# Patient Record
Sex: MALE | Race: WHITE | Employment: OTHER | URBAN - METROPOLITAN AREA
[De-identification: names, ages, dates, MRNs, and addresses within clinical notes are randomized per-mention and may not be internally consistent; named-entity substitution may affect disease eponyms.]

---

## 2018-01-01 ENCOUNTER — APPOINTMENT (OUTPATIENT)
Dept: SPEECH THERAPY | Facility: CLINIC | Age: 83
End: 2018-01-01
Payer: COMMERCIAL

## 2018-01-01 ENCOUNTER — TELEPHONE (OUTPATIENT)
Dept: GERIATRICS | Facility: CLINIC | Age: 83
End: 2018-01-01

## 2018-01-01 ENCOUNTER — NURSING HOME VISIT (OUTPATIENT)
Dept: GERIATRICS | Facility: CLINIC | Age: 83
End: 2018-01-01
Payer: COMMERCIAL

## 2018-01-01 ENCOUNTER — NURSING HOME VISIT (OUTPATIENT)
Dept: GERIATRICS | Facility: CLINIC | Age: 83
End: 2018-01-01
Payer: MEDICARE

## 2018-01-01 ENCOUNTER — CARE COORDINATION (OUTPATIENT)
Dept: GERIATRIC MEDICINE | Facility: CLINIC | Age: 83
End: 2018-01-01

## 2018-01-01 ENCOUNTER — HOSPITAL ENCOUNTER (OUTPATIENT)
Facility: CLINIC | Age: 83
Setting detail: OBSERVATION
Discharge: NURSING, REHAB OR LONG-TERM CARE | End: 2018-05-11
Attending: FAMILY MEDICINE | Admitting: FAMILY MEDICINE
Payer: COMMERCIAL

## 2018-01-01 ENCOUNTER — MEDICAL CORRESPONDENCE (OUTPATIENT)
Dept: HEALTH INFORMATION MANAGEMENT | Facility: CLINIC | Age: 83
End: 2018-01-01

## 2018-01-01 ENCOUNTER — APPOINTMENT (OUTPATIENT)
Dept: GENERAL RADIOLOGY | Facility: CLINIC | Age: 83
End: 2018-01-01
Attending: FAMILY MEDICINE
Payer: COMMERCIAL

## 2018-01-01 ENCOUNTER — TRANSFERRED RECORDS (OUTPATIENT)
Dept: HEALTH INFORMATION MANAGEMENT | Facility: CLINIC | Age: 83
End: 2018-01-01

## 2018-01-01 ENCOUNTER — RECORDS - HEALTHEAST (OUTPATIENT)
Dept: LAB | Facility: CLINIC | Age: 83
End: 2018-01-01

## 2018-01-01 ENCOUNTER — PATIENT OUTREACH (OUTPATIENT)
Dept: GERIATRIC MEDICINE | Facility: CLINIC | Age: 83
End: 2018-01-01

## 2018-01-01 VITALS
OXYGEN SATURATION: 97 % | SYSTOLIC BLOOD PRESSURE: 148 MMHG | DIASTOLIC BLOOD PRESSURE: 73 MMHG | HEART RATE: 90 BPM | BODY MASS INDEX: 17.36 KG/M2 | TEMPERATURE: 96.9 F | WEIGHT: 128 LBS | RESPIRATION RATE: 16 BRPM

## 2018-01-01 VITALS
DIASTOLIC BLOOD PRESSURE: 68 MMHG | TEMPERATURE: 98.3 F | SYSTOLIC BLOOD PRESSURE: 119 MMHG | OXYGEN SATURATION: 99 % | HEART RATE: 64 BPM | RESPIRATION RATE: 15 BRPM | WEIGHT: 146.88 LBS | BODY MASS INDEX: 22.33 KG/M2

## 2018-01-01 VITALS
SYSTOLIC BLOOD PRESSURE: 143 MMHG | RESPIRATION RATE: 14 BRPM | HEART RATE: 63 BPM | HEIGHT: 72 IN | WEIGHT: 146.8 LBS | BODY MASS INDEX: 19.88 KG/M2 | TEMPERATURE: 95.5 F | OXYGEN SATURATION: 96 % | DIASTOLIC BLOOD PRESSURE: 64 MMHG

## 2018-01-01 VITALS
RESPIRATION RATE: 20 BRPM | OXYGEN SATURATION: 98 % | TEMPERATURE: 97.1 F | DIASTOLIC BLOOD PRESSURE: 85 MMHG | SYSTOLIC BLOOD PRESSURE: 163 MMHG | WEIGHT: 155 LBS | HEART RATE: 75 BPM

## 2018-01-01 VITALS
BODY MASS INDEX: 17.95 KG/M2 | WEIGHT: 132.5 LBS | HEART RATE: 66 BPM | TEMPERATURE: 98.1 F | HEIGHT: 72 IN | DIASTOLIC BLOOD PRESSURE: 76 MMHG | RESPIRATION RATE: 14 BRPM | SYSTOLIC BLOOD PRESSURE: 138 MMHG

## 2018-01-01 VITALS
OXYGEN SATURATION: 96 % | WEIGHT: 155.75 LBS | DIASTOLIC BLOOD PRESSURE: 77 MMHG | TEMPERATURE: 96.5 F | HEART RATE: 70 BPM | SYSTOLIC BLOOD PRESSURE: 149 MMHG | RESPIRATION RATE: 12 BRPM

## 2018-01-01 VITALS
WEIGHT: 143 LBS | OXYGEN SATURATION: 95 % | DIASTOLIC BLOOD PRESSURE: 80 MMHG | TEMPERATURE: 96.3 F | HEART RATE: 60 BPM | RESPIRATION RATE: 17 BRPM | SYSTOLIC BLOOD PRESSURE: 132 MMHG | BODY MASS INDEX: 21.74 KG/M2

## 2018-01-01 VITALS
BODY MASS INDEX: 21.22 KG/M2 | RESPIRATION RATE: 20 BRPM | TEMPERATURE: 98.5 F | DIASTOLIC BLOOD PRESSURE: 81 MMHG | HEIGHT: 68 IN | WEIGHT: 139.99 LBS | OXYGEN SATURATION: 97 % | HEART RATE: 70 BPM | SYSTOLIC BLOOD PRESSURE: 146 MMHG

## 2018-01-01 VITALS
WEIGHT: 155.25 LBS | SYSTOLIC BLOOD PRESSURE: 140 MMHG | DIASTOLIC BLOOD PRESSURE: 68 MMHG | HEART RATE: 56 BPM | TEMPERATURE: 98 F | OXYGEN SATURATION: 96 % | RESPIRATION RATE: 22 BRPM

## 2018-01-01 VITALS
WEIGHT: 150.1 LBS | TEMPERATURE: 96.8 F | RESPIRATION RATE: 18 BRPM | DIASTOLIC BLOOD PRESSURE: 75 MMHG | OXYGEN SATURATION: 95 % | HEART RATE: 107 BPM | BODY MASS INDEX: 22.82 KG/M2 | SYSTOLIC BLOOD PRESSURE: 110 MMHG

## 2018-01-01 VITALS
DIASTOLIC BLOOD PRESSURE: 63 MMHG | TEMPERATURE: 97.6 F | SYSTOLIC BLOOD PRESSURE: 111 MMHG | HEART RATE: 81 BPM | OXYGEN SATURATION: 98 % | WEIGHT: 143 LBS | BODY MASS INDEX: 21.74 KG/M2 | RESPIRATION RATE: 15 BRPM

## 2018-01-01 VITALS
BODY MASS INDEX: 19.76 KG/M2 | DIASTOLIC BLOOD PRESSURE: 64 MMHG | HEIGHT: 72 IN | SYSTOLIC BLOOD PRESSURE: 122 MMHG | HEART RATE: 60 BPM | OXYGEN SATURATION: 98 % | RESPIRATION RATE: 16 BRPM | WEIGHT: 145.9 LBS | TEMPERATURE: 97.3 F

## 2018-01-01 DIAGNOSIS — F31.70 BIPOLAR DISORDER IN PARTIAL REMISSION, MOST RECENT EPISODE UNSPECIFIED TYPE (H): Chronic | ICD-10-CM

## 2018-01-01 DIAGNOSIS — Z51.5 HOSPICE CARE PATIENT: ICD-10-CM

## 2018-01-01 DIAGNOSIS — I50.9 CHRONIC CONGESTIVE HEART FAILURE, UNSPECIFIED CONGESTIVE HEART FAILURE TYPE: ICD-10-CM

## 2018-01-01 DIAGNOSIS — G30.1 LATE ONSET ALZHEIMER'S DISEASE WITHOUT BEHAVIORAL DISTURBANCE (H): ICD-10-CM

## 2018-01-01 DIAGNOSIS — R13.19 OTHER DYSPHAGIA: ICD-10-CM

## 2018-01-01 DIAGNOSIS — R33.9 URINE RETENTION: ICD-10-CM

## 2018-01-01 DIAGNOSIS — G30.1 LATE ONSET ALZHEIMER'S DISEASE WITHOUT BEHAVIORAL DISTURBANCE (H): Primary | ICD-10-CM

## 2018-01-01 DIAGNOSIS — F02.80 LATE ONSET ALZHEIMER'S DISEASE WITHOUT BEHAVIORAL DISTURBANCE (H): ICD-10-CM

## 2018-01-01 DIAGNOSIS — E03.9 HYPOTHYROIDISM, UNSPECIFIED TYPE: ICD-10-CM

## 2018-01-01 DIAGNOSIS — F32.A DEPRESSIVE DISORDER: ICD-10-CM

## 2018-01-01 DIAGNOSIS — K21.9 GASTROESOPHAGEAL REFLUX DISEASE WITHOUT ESOPHAGITIS: ICD-10-CM

## 2018-01-01 DIAGNOSIS — F31.32 BIPOLAR AFFECTIVE DISORDER, CURRENTLY DEPRESSED, MODERATE (H): Primary | ICD-10-CM

## 2018-01-01 DIAGNOSIS — K59.09 OTHER CONSTIPATION: ICD-10-CM

## 2018-01-01 DIAGNOSIS — F02.80 LATE ONSET ALZHEIMER'S DISEASE WITHOUT BEHAVIORAL DISTURBANCE (H): Primary | ICD-10-CM

## 2018-01-01 DIAGNOSIS — F31.32 BIPOLAR AFFECTIVE DISORDER, CURRENTLY DEPRESSED, MODERATE (H): ICD-10-CM

## 2018-01-01 DIAGNOSIS — R41.82 ALTERED MENTAL STATUS, UNSPECIFIED ALTERED MENTAL STATUS TYPE: ICD-10-CM

## 2018-01-01 DIAGNOSIS — N18.9 CHRONIC KIDNEY DISEASE, UNSPECIFIED CKD STAGE: ICD-10-CM

## 2018-01-01 DIAGNOSIS — F31.70 BIPOLAR DISORDER IN PARTIAL REMISSION, MOST RECENT EPISODE UNSPECIFIED TYPE (H): ICD-10-CM

## 2018-01-01 DIAGNOSIS — Z91.81 PERSONAL HISTORY OF FALL: Primary | ICD-10-CM

## 2018-01-01 DIAGNOSIS — F31.70 BIPOLAR DISORDER IN PARTIAL REMISSION, MOST RECENT EPISODE UNSPECIFIED TYPE (H): Primary | ICD-10-CM

## 2018-01-01 DIAGNOSIS — J69.0 PNEUMONITIS DUE TO INHALATION OF FOOD OR VOMITUS (H): Primary | ICD-10-CM

## 2018-01-01 DIAGNOSIS — J69.0 PNEUMONITIS DUE TO INHALATION OF FOOD OR VOMITUS (H): ICD-10-CM

## 2018-01-01 DIAGNOSIS — F32.A DEPRESSIVE DISORDER: Chronic | ICD-10-CM

## 2018-01-01 DIAGNOSIS — J69.0 ASPIRATION PNEUMONITIS (H): Primary | ICD-10-CM

## 2018-01-01 DIAGNOSIS — R62.7 ADULT FAILURE TO THRIVE: ICD-10-CM

## 2018-01-01 DIAGNOSIS — I50.9 CHRONIC CONGESTIVE HEART FAILURE, UNSPECIFIED CONGESTIVE HEART FAILURE TYPE: Primary | ICD-10-CM

## 2018-01-01 DIAGNOSIS — E44.0 MODERATE PROTEIN-CALORIE MALNUTRITION (H): ICD-10-CM

## 2018-01-01 DIAGNOSIS — J69.0 ASPIRATION PNEUMONIA DUE TO INHALATION OF VOMITUS (H): ICD-10-CM

## 2018-01-01 LAB
ALBUMIN SERPL-MCNC: 3.8 G/DL (ref 3.4–5)
ALBUMIN UR-MCNC: NEGATIVE MG/DL
ALP SERPL-CCNC: 66 U/L (ref 40–150)
ALT SERPL W P-5'-P-CCNC: 34 U/L (ref 0–70)
ANION GAP SERPL CALCULATED.3IONS-SCNC: 11 MMOL/L (ref 5–18)
ANION GAP SERPL CALCULATED.3IONS-SCNC: 6 MMOL/L (ref 3–14)
ANION GAP SERPL CALCULATED.3IONS-SCNC: 6 MMOL/L (ref 3–14)
ANION GAP SERPL CALCULATED.3IONS-SCNC: 8 MMOL/L (ref 5–18)
ANION GAP SERPL CALCULATED.3IONS-SCNC: 8 MMOL/L (ref 5–18)
ANION GAP SERPL CALCULATED.3IONS-SCNC: 9 MMOL/L (ref 5–18)
APPEARANCE UR: CLEAR
AST SERPL W P-5'-P-CCNC: 22 U/L (ref 0–45)
BACTERIA SPEC CULT: NO GROWTH
BASOPHILS # BLD AUTO: 0 10E9/L (ref 0–0.2)
BASOPHILS # BLD AUTO: 0 10E9/L (ref 0–0.2)
BASOPHILS NFR BLD AUTO: 0.1 %
BASOPHILS NFR BLD AUTO: 0.2 %
BILIRUB DIRECT SERPL-MCNC: <0.1 MG/DL (ref 0–0.2)
BILIRUB SERPL-MCNC: 0.2 MG/DL (ref 0.2–1.3)
BILIRUB UR QL STRIP: NEGATIVE
BNP SERPL-MCNC: 248 PG/ML (ref 0–93)
BUN SERPL-MCNC: 29 MG/DL (ref 8–28)
BUN SERPL-MCNC: 29 MG/DL (ref 8–28)
BUN SERPL-MCNC: 30 MG/DL (ref 7–30)
BUN SERPL-MCNC: 30 MG/DL (ref 8–28)
BUN SERPL-MCNC: 32 MG/DL (ref 8–28)
BUN SERPL-MCNC: 37 MG/DL (ref 7–30)
CALCIUM SERPL-MCNC: 8.4 MG/DL (ref 8.5–10.1)
CALCIUM SERPL-MCNC: 8.9 MG/DL (ref 8.5–10.1)
CALCIUM SERPL-MCNC: 9.2 MG/DL (ref 8.5–10.5)
CALCIUM SERPL-MCNC: 9.2 MG/DL (ref 8.5–10.5)
CALCIUM SERPL-MCNC: 9.6 MG/DL (ref 8.5–10.5)
CALCIUM SERPL-MCNC: 9.7 MG/DL (ref 8.5–10.5)
CHLORIDE BLD-SCNC: 109 MMOL/L (ref 98–107)
CHLORIDE BLD-SCNC: 110 MMOL/L (ref 98–107)
CHLORIDE BLD-SCNC: 115 MMOL/L (ref 98–107)
CHLORIDE SERPL-SCNC: 114 MMOL/L (ref 94–109)
CHLORIDE SERPL-SCNC: 116 MMOL/L (ref 94–109)
CHLORIDE SERPLBLD-SCNC: 109 MMOL/L (ref 98–107)
CO2 SERPL-SCNC: 21 MMOL/L (ref 22–31)
CO2 SERPL-SCNC: 23 MMOL/L (ref 20–32)
CO2 SERPL-SCNC: 24 MMOL/L (ref 20–32)
CO2 SERPL-SCNC: 25 MMOL/L (ref 22–31)
COLOR UR AUTO: YELLOW
CREAT SERPL-MCNC: 0.99 MG/DL (ref 0.7–1.3)
CREAT SERPL-MCNC: 1.15 MG/DL (ref 0.7–1.3)
CREAT SERPL-MCNC: 1.15 MG/DL (ref 0.7–1.3)
CREAT SERPL-MCNC: 1.2 MG/DL (ref 0.66–1.25)
CREAT SERPL-MCNC: 1.29 MG/DL (ref 0.7–1.3)
CREAT SERPL-MCNC: 1.5 MG/DL (ref 0.66–1.25)
DIFFERENTIAL METHOD BLD: ABNORMAL
DIFFERENTIAL METHOD BLD: ABNORMAL
EOSINOPHIL # BLD AUTO: 0 10E9/L (ref 0–0.7)
EOSINOPHIL # BLD AUTO: 0.1 10E9/L (ref 0–0.7)
EOSINOPHIL NFR BLD AUTO: 0.1 %
EOSINOPHIL NFR BLD AUTO: 0.4 %
ERYTHROCYTE [DISTWIDTH] IN BLOOD BY AUTOMATED COUNT: 13.2 % (ref 10–15)
ERYTHROCYTE [DISTWIDTH] IN BLOOD BY AUTOMATED COUNT: 13.6 % (ref 10–15)
ERYTHROCYTE [DISTWIDTH] IN BLOOD BY AUTOMATED COUNT: 13.7 % (ref 11–14.5)
GFR SERPL CREATININE-BSD FRML MDRD: 44 ML/MIN/1.7M2
GFR SERPL CREATININE-BSD FRML MDRD: 53 ML/MIN/1.73M2
GFR SERPL CREATININE-BSD FRML MDRD: 57 ML/MIN/1.7M2
GFR SERPL CREATININE-BSD FRML MDRD: 60 ML/MIN/1.73M2
GFR SERPL CREATININE-BSD FRML MDRD: 60 ML/MIN/1.73M2
GFR SERPL CREATININE-BSD FRML MDRD: >60 ML/MIN/1.73M2
GLUCOSE BLD-MCNC: 63 MG/DL (ref 70–125)
GLUCOSE BLD-MCNC: 73 MG/DL (ref 70–125)
GLUCOSE BLD-MCNC: 86 MG/DL (ref 70–125)
GLUCOSE SERPL-MCNC: 114 MG/DL (ref 70–99)
GLUCOSE SERPL-MCNC: 126 MG/DL (ref 70–99)
GLUCOSE SERPL-MCNC: 63 MG/DL (ref 70–125)
GLUCOSE UR STRIP-MCNC: 50 MG/DL
HCT VFR BLD AUTO: 38.4 % (ref 40–53)
HCT VFR BLD AUTO: 43.7 % (ref 40–53)
HCT VFR BLD AUTO: 45.8 % (ref 40–54)
HGB BLD-MCNC: 12.7 G/DL (ref 13.3–17.7)
HGB BLD-MCNC: 13.1 G/DL (ref 14–18)
HGB BLD-MCNC: 14.6 G/DL (ref 13.3–17.7)
HGB BLD-MCNC: 14.9 G/DL (ref 14–18)
HGB UR QL STRIP: NEGATIVE
IMM GRANULOCYTES # BLD: 0 10E9/L (ref 0–0.4)
IMM GRANULOCYTES # BLD: 0 10E9/L (ref 0–0.4)
IMM GRANULOCYTES NFR BLD: 0.1 %
IMM GRANULOCYTES NFR BLD: 0.1 %
KETONES UR STRIP-MCNC: NEGATIVE MG/DL
LACTATE BLD-SCNC: 1.4 MMOL/L (ref 0.7–2)
LEUKOCYTE ESTERASE UR QL STRIP: NEGATIVE
LYMPHOCYTES # BLD AUTO: 0.5 10E9/L (ref 0.8–5.3)
LYMPHOCYTES # BLD AUTO: 0.8 10E9/L (ref 0.8–5.3)
LYMPHOCYTES NFR BLD AUTO: 3.8 %
LYMPHOCYTES NFR BLD AUTO: 5.8 %
Lab: NORMAL
MCH RBC QN AUTO: 29.3 PG (ref 26.5–33)
MCH RBC QN AUTO: 29.4 PG (ref 26.5–33)
MCH RBC QN AUTO: 29.4 PG (ref 27–34)
MCHC RBC AUTO-ENTMCNC: 32.5 G/DL (ref 32–36)
MCHC RBC AUTO-ENTMCNC: 33.1 G/DL (ref 31.5–36.5)
MCHC RBC AUTO-ENTMCNC: 33.4 G/DL (ref 31.5–36.5)
MCV RBC AUTO: 88 FL (ref 78–100)
MCV RBC AUTO: 89 FL (ref 78–100)
MCV RBC AUTO: 91 FL (ref 80–100)
MONOCYTES # BLD AUTO: 0.8 10E9/L (ref 0–1.3)
MONOCYTES # BLD AUTO: 0.9 10E9/L (ref 0–1.3)
MONOCYTES NFR BLD AUTO: 6.2 %
MONOCYTES NFR BLD AUTO: 6.5 %
NEUTROPHILS # BLD AUTO: 11.8 10E9/L (ref 1.6–8.3)
NEUTROPHILS # BLD AUTO: 12.3 10E9/L (ref 1.6–8.3)
NEUTROPHILS NFR BLD AUTO: 87.3 %
NEUTROPHILS NFR BLD AUTO: 89.4 %
NITRATE UR QL: NEGATIVE
PH UR STRIP: 6 PH (ref 5–7)
PLATELET # BLD AUTO: 173 10E9/L (ref 150–450)
PLATELET # BLD AUTO: 204 10E9/L (ref 150–450)
PLATELET # BLD AUTO: 210 THOU/UL (ref 140–440)
PMV BLD AUTO: 11.3 FL (ref 8.5–12.5)
POTASSIUM BLD-SCNC: 3.7 MMOL/L (ref 3.5–5)
POTASSIUM BLD-SCNC: 4.1 MMOL/L (ref 3.5–5)
POTASSIUM BLD-SCNC: 4.9 MMOL/L (ref 3.5–5)
POTASSIUM SERPL-SCNC: 4.1 MMOL/L (ref 3.4–5.3)
POTASSIUM SERPL-SCNC: 4.4 MMOL/L (ref 3.4–5.3)
POTASSIUM SERPL-SCNC: 4.9 MMOL/L (ref 3.5–5)
PROT SERPL-MCNC: 7.5 G/DL (ref 6.8–8.8)
RBC # BLD AUTO: 4.33 10E12/L (ref 4.4–5.9)
RBC # BLD AUTO: 4.97 10E12/L (ref 4.4–5.9)
RBC # BLD AUTO: 5.06 MILL/UL (ref 4.4–6.2)
RBC #/AREA URNS AUTO: 1 /HPF (ref 0–2)
SODIUM SERPL-SCNC: 142 MMOL/L (ref 136–145)
SODIUM SERPL-SCNC: 142 MMOL/L (ref 136–145)
SODIUM SERPL-SCNC: 144 MMOL/L (ref 133–144)
SODIUM SERPL-SCNC: 144 MMOL/L (ref 136–145)
SODIUM SERPL-SCNC: 145 MMOL/L (ref 133–144)
SODIUM SERPL-SCNC: 147 MMOL/L (ref 136–145)
SOURCE: ABNORMAL
SP GR UR STRIP: 1.02 (ref 1–1.03)
SPECIMEN SOURCE: NORMAL
TROPONIN I SERPL-MCNC: <0.015 UG/L (ref 0–0.04)
TSH SERPL DL<=0.005 MIU/L-ACNC: 0.78 UIU/ML (ref 0.3–5)
TSH SERPL-ACNC: 0.78 UIU/ML (ref 0.3–5)
UROBILINOGEN UR STRIP-MCNC: 0 MG/DL (ref 0–2)
VALPROATE SERPL-MCNC: 12.5 UG/ML (ref 50–150)
WBC # BLD AUTO: 13.6 10E9/L (ref 4–11)
WBC # BLD AUTO: 13.8 10E9/L (ref 4–11)
WBC #/AREA URNS AUTO: <1 /HPF (ref 0–5)
WBC: 9.5 THOU/UL (ref 4–11)

## 2018-01-01 PROCEDURE — 25000132 ZZH RX MED GY IP 250 OP 250 PS 637: Performed by: PHYSICIAN ASSISTANT

## 2018-01-01 PROCEDURE — 71046 X-RAY EXAM CHEST 2 VIEWS: CPT

## 2018-01-01 PROCEDURE — 99309 SBSQ NF CARE MODERATE MDM 30: CPT | Performed by: FAMILY MEDICINE

## 2018-01-01 PROCEDURE — 99223 1ST HOSP IP/OBS HIGH 75: CPT | Mod: AI | Performed by: PHYSICIAN ASSISTANT

## 2018-01-01 PROCEDURE — 99309 SBSQ NF CARE MODERATE MDM 30: CPT | Performed by: NURSE PRACTITIONER

## 2018-01-01 PROCEDURE — 36415 COLL VENOUS BLD VENIPUNCTURE: CPT | Performed by: PHYSICIAN ASSISTANT

## 2018-01-01 PROCEDURE — 99308 SBSQ NF CARE LOW MDM 20: CPT | Mod: GW | Performed by: NURSE PRACTITIONER

## 2018-01-01 PROCEDURE — 25000128 H RX IP 250 OP 636: Performed by: PHYSICIAN ASSISTANT

## 2018-01-01 PROCEDURE — 40000225 ZZH STATISTIC SLP WARD VISIT: Performed by: SPEECH-LANGUAGE PATHOLOGIST

## 2018-01-01 PROCEDURE — 84484 ASSAY OF TROPONIN QUANT: CPT | Performed by: FAMILY MEDICINE

## 2018-01-01 PROCEDURE — G8996 SWALLOW CURRENT STATUS: HCPCS | Mod: GN,CK | Performed by: SPEECH-LANGUAGE PATHOLOGIST

## 2018-01-01 PROCEDURE — 80048 BASIC METABOLIC PNL TOTAL CA: CPT | Performed by: PHYSICIAN ASSISTANT

## 2018-01-01 PROCEDURE — 99305 1ST NF CARE MODERATE MDM 35: CPT | Performed by: FAMILY MEDICINE

## 2018-01-01 PROCEDURE — 92610 EVALUATE SWALLOWING FUNCTION: CPT | Mod: GN | Performed by: SPEECH-LANGUAGE PATHOLOGIST

## 2018-01-01 PROCEDURE — 99318 ZZC ANNUAL NURSING FAC ASSESSMNT, STABLE: CPT | Mod: GW | Performed by: NURSE PRACTITIONER

## 2018-01-01 PROCEDURE — 83605 ASSAY OF LACTIC ACID: CPT | Performed by: FAMILY MEDICINE

## 2018-01-01 PROCEDURE — 85025 COMPLETE CBC W/AUTO DIFF WBC: CPT | Performed by: PHYSICIAN ASSISTANT

## 2018-01-01 PROCEDURE — 82248 BILIRUBIN DIRECT: CPT | Performed by: FAMILY MEDICINE

## 2018-01-01 PROCEDURE — G0378 HOSPITAL OBSERVATION PER HR: HCPCS

## 2018-01-01 PROCEDURE — 99217 ZZC OBSERVATION CARE DISCHARGE: CPT | Performed by: FAMILY MEDICINE

## 2018-01-01 PROCEDURE — 85025 COMPLETE CBC W/AUTO DIFF WBC: CPT | Performed by: FAMILY MEDICINE

## 2018-01-01 PROCEDURE — 25000132 ZZH RX MED GY IP 250 OP 250 PS 637: Performed by: FAMILY MEDICINE

## 2018-01-01 PROCEDURE — G8998 SWALLOW D/C STATUS: HCPCS | Mod: GN,CK | Performed by: SPEECH-LANGUAGE PATHOLOGIST

## 2018-01-01 PROCEDURE — 12000000 ZZH R&B MED SURG/OB

## 2018-01-01 PROCEDURE — 99309 SBSQ NF CARE MODERATE MDM 30: CPT | Mod: GW | Performed by: FAMILY MEDICINE

## 2018-01-01 PROCEDURE — 96376 TX/PRO/DX INJ SAME DRUG ADON: CPT

## 2018-01-01 PROCEDURE — 87086 URINE CULTURE/COLONY COUNT: CPT | Performed by: PHYSICIAN ASSISTANT

## 2018-01-01 PROCEDURE — G8997 SWALLOW GOAL STATUS: HCPCS | Mod: GN,CK | Performed by: SPEECH-LANGUAGE PATHOLOGIST

## 2018-01-01 PROCEDURE — 99285 EMERGENCY DEPT VISIT HI MDM: CPT | Mod: 25 | Performed by: FAMILY MEDICINE

## 2018-01-01 PROCEDURE — 81001 URINALYSIS AUTO W/SCOPE: CPT | Performed by: PHYSICIAN ASSISTANT

## 2018-01-01 PROCEDURE — 25000128 H RX IP 250 OP 636: Performed by: FAMILY MEDICINE

## 2018-01-01 PROCEDURE — 80053 COMPREHEN METABOLIC PANEL: CPT | Performed by: FAMILY MEDICINE

## 2018-01-01 PROCEDURE — 96365 THER/PROPH/DIAG IV INF INIT: CPT | Performed by: FAMILY MEDICINE

## 2018-01-01 PROCEDURE — 27210995 ZZH RX 272: Performed by: FAMILY MEDICINE

## 2018-01-01 RX ORDER — VENLAFAXINE HYDROCHLORIDE 75 MG/1
75 CAPSULE, EXTENDED RELEASE ORAL DAILY
Status: DISCONTINUED | OUTPATIENT
Start: 2018-01-01 | End: 2018-01-01 | Stop reason: DRUGHIGH

## 2018-01-01 RX ORDER — AMPICILLIN AND SULBACTAM 2; 1 G/1; G/1
3 INJECTION, POWDER, FOR SOLUTION INTRAMUSCULAR; INTRAVENOUS EVERY 6 HOURS
Status: DISCONTINUED | OUTPATIENT
Start: 2018-01-01 | End: 2018-01-01 | Stop reason: HOSPADM

## 2018-01-01 RX ORDER — VENLAFAXINE HYDROCHLORIDE 75 MG/1
75 TABLET, EXTENDED RELEASE ORAL
Status: DISCONTINUED | OUTPATIENT
Start: 2018-01-01 | End: 2018-01-01 | Stop reason: HOSPADM

## 2018-01-01 RX ORDER — ACETAMINOPHEN 650 MG/1
650 SUPPOSITORY RECTAL EVERY 4 HOURS PRN
COMMUNITY

## 2018-01-01 RX ORDER — NALOXONE HYDROCHLORIDE 0.4 MG/ML
.1-.4 INJECTION, SOLUTION INTRAMUSCULAR; INTRAVENOUS; SUBCUTANEOUS
Status: DISCONTINUED | OUTPATIENT
Start: 2018-01-01 | End: 2018-01-01 | Stop reason: HOSPADM

## 2018-01-01 RX ORDER — BISACODYL 10 MG
10 SUPPOSITORY, RECTAL RECTAL DAILY PRN
COMMUNITY
End: 2018-01-01

## 2018-01-01 RX ORDER — SODIUM CHLORIDE 9 MG/ML
INJECTION, SOLUTION INTRAVENOUS CONTINUOUS
Status: DISCONTINUED | OUTPATIENT
Start: 2018-01-01 | End: 2018-01-01

## 2018-01-01 RX ORDER — ACETAMINOPHEN 325 MG/1
650 TABLET ORAL EVERY 4 HOURS PRN
Status: DISCONTINUED | OUTPATIENT
Start: 2018-01-01 | End: 2018-01-01 | Stop reason: HOSPADM

## 2018-01-01 RX ORDER — PROCHLORPERAZINE MALEATE 5 MG
5 TABLET ORAL EVERY 6 HOURS PRN
Status: DISCONTINUED | OUTPATIENT
Start: 2018-01-01 | End: 2018-01-01 | Stop reason: HOSPADM

## 2018-01-01 RX ORDER — ONDANSETRON 4 MG/1
4 TABLET, ORALLY DISINTEGRATING ORAL EVERY 6 HOURS PRN
Status: DISCONTINUED | OUTPATIENT
Start: 2018-01-01 | End: 2018-01-01 | Stop reason: HOSPADM

## 2018-01-01 RX ORDER — BISACODYL 10 MG
10 SUPPOSITORY, RECTAL RECTAL DAILY PRN
COMMUNITY

## 2018-01-01 RX ORDER — AMOXICILLIN AND CLAVULANATE POTASSIUM 500; 125 MG/1; MG/1
1 TABLET, FILM COATED ORAL 2 TIMES DAILY
Qty: 20 TABLET | Refills: 0 | DISCHARGE
Start: 2018-01-01 | End: 2018-01-01

## 2018-01-01 RX ORDER — ASPIRIN 81 MG/1
81 TABLET, CHEWABLE ORAL AT BEDTIME
Status: DISCONTINUED | OUTPATIENT
Start: 2018-01-01 | End: 2018-01-01 | Stop reason: HOSPADM

## 2018-01-01 RX ORDER — POLYETHYLENE GLYCOL 3350 17 G/17G
17 POWDER, FOR SOLUTION ORAL DAILY
COMMUNITY
End: 2018-01-01

## 2018-01-01 RX ORDER — MORPHINE SULFATE 20 MG/ML
SOLUTION ORAL
COMMUNITY

## 2018-01-01 RX ORDER — ONDANSETRON 2 MG/ML
4 INJECTION INTRAMUSCULAR; INTRAVENOUS EVERY 6 HOURS PRN
Status: DISCONTINUED | OUTPATIENT
Start: 2018-01-01 | End: 2018-01-01 | Stop reason: HOSPADM

## 2018-01-01 RX ORDER — PROCHLORPERAZINE 25 MG
12.5 SUPPOSITORY, RECTAL RECTAL EVERY 12 HOURS PRN
Status: DISCONTINUED | OUTPATIENT
Start: 2018-01-01 | End: 2018-01-01 | Stop reason: HOSPADM

## 2018-01-01 RX ORDER — AMPICILLIN AND SULBACTAM 2; 1 G/1; G/1
3 INJECTION, POWDER, FOR SOLUTION INTRAMUSCULAR; INTRAVENOUS ONCE
Status: COMPLETED | OUTPATIENT
Start: 2018-01-01 | End: 2018-01-01

## 2018-01-01 RX ORDER — SENNOSIDES 8.6 MG
1 TABLET ORAL 2 TIMES DAILY
COMMUNITY

## 2018-01-01 RX ORDER — HYOSCYAMINE SULFATE 0.125 MG
0.12 TABLET ORAL EVERY 4 HOURS PRN
COMMUNITY

## 2018-01-01 RX ORDER — VENLAFAXINE HYDROCHLORIDE 75 MG/1
75 CAPSULE, EXTENDED RELEASE ORAL DAILY
COMMUNITY

## 2018-01-01 RX ORDER — LEVOTHYROXINE SODIUM 100 UG/1
88 TABLET ORAL DAILY
COMMUNITY
End: 2018-01-01

## 2018-01-01 RX ORDER — FLUTICASONE PROPIONATE 50 MCG
1 SPRAY, SUSPENSION (ML) NASAL DAILY
COMMUNITY

## 2018-01-01 RX ORDER — SODIUM CHLORIDE 450 MG/100ML
INJECTION, SOLUTION INTRAVENOUS CONTINUOUS
Status: DISCONTINUED | OUTPATIENT
Start: 2018-01-01 | End: 2018-01-01 | Stop reason: HOSPADM

## 2018-01-01 RX ORDER — SODIUM CHLORIDE 9 MG/ML
1000 INJECTION, SOLUTION INTRAVENOUS CONTINUOUS
Status: DISCONTINUED | OUTPATIENT
Start: 2018-01-01 | End: 2018-01-01

## 2018-01-01 RX ORDER — LEVOTHYROXINE SODIUM 88 UG/1
88 TABLET ORAL
Status: DISCONTINUED | OUTPATIENT
Start: 2018-01-01 | End: 2018-01-01 | Stop reason: HOSPADM

## 2018-01-01 RX ADMIN — ACETAMINOPHEN 650 MG: 325 TABLET, FILM COATED ORAL at 23:41

## 2018-01-01 RX ADMIN — SODIUM CHLORIDE: 9 INJECTION, SOLUTION INTRAVENOUS at 04:55

## 2018-01-01 RX ADMIN — SODIUM CHLORIDE: 4.5 INJECTION, SOLUTION INTRAVENOUS at 08:37

## 2018-01-01 RX ADMIN — AMPICILLIN SODIUM AND SULBACTAM SODIUM 3 G: 2; 1 INJECTION, POWDER, FOR SOLUTION INTRAMUSCULAR; INTRAVENOUS at 11:27

## 2018-01-01 RX ADMIN — SODIUM CHLORIDE 500 ML: 9 INJECTION, SOLUTION INTRAVENOUS at 21:42

## 2018-01-01 RX ADMIN — VENLAFAXINE HYDROCHLORIDE 75 MG: 75 TABLET, EXTENDED RELEASE ORAL at 08:30

## 2018-01-01 RX ADMIN — ASPIRIN 81 MG 81 MG: 81 TABLET ORAL at 23:41

## 2018-01-01 RX ADMIN — AMPICILLIN SODIUM AND SULBACTAM SODIUM 3 G: 2; 1 INJECTION, POWDER, FOR SOLUTION INTRAMUSCULAR; INTRAVENOUS at 04:53

## 2018-01-01 RX ADMIN — LEVOTHYROXINE SODIUM 88 MCG: 88 TABLET ORAL at 08:30

## 2018-01-01 RX ADMIN — AMPICILLIN SODIUM AND SULBACTAM SODIUM 3 G: 2; 1 INJECTION, POWDER, FOR SOLUTION INTRAMUSCULAR; INTRAVENOUS at 22:12

## 2018-02-10 ENCOUNTER — RECORDS - HEALTHEAST (OUTPATIENT)
Dept: LAB | Facility: CLINIC | Age: 83
End: 2018-02-10

## 2018-02-10 LAB
ANION GAP SERPL CALCULATED.3IONS-SCNC: 8 MMOL/L (ref 5–18)
BUN SERPL-MCNC: 25 MG/DL (ref 8–28)
CALCIUM SERPL-MCNC: 8.5 MG/DL (ref 8.5–10.5)
CHLORIDE BLD-SCNC: 109 MMOL/L (ref 98–107)
CO2 SERPL-SCNC: 22 MMOL/L (ref 22–31)
CREAT SERPL-MCNC: 1.09 MG/DL (ref 0.7–1.3)
GFR SERPL CREATININE-BSD FRML MDRD: >60 ML/MIN/1.73M2
GLUCOSE BLD-MCNC: 88 MG/DL (ref 70–125)
POTASSIUM BLD-SCNC: 4.5 MMOL/L (ref 3.5–5)
SODIUM SERPL-SCNC: 139 MMOL/L (ref 136–145)

## 2018-02-22 ENCOUNTER — RECORDS - HEALTHEAST (OUTPATIENT)
Dept: LAB | Facility: CLINIC | Age: 83
End: 2018-02-22

## 2018-02-23 LAB — 25(OH)D3 SERPL-MCNC: 31.5 NG/ML (ref 30–80)

## 2018-03-12 PROBLEM — E03.9 HYPOTHYROIDISM, UNSPECIFIED TYPE: Status: ACTIVE | Noted: 2018-01-01

## 2018-03-12 PROBLEM — N18.9 CHRONIC KIDNEY DISEASE, UNSPECIFIED CKD STAGE: Status: ACTIVE | Noted: 2018-01-01

## 2018-03-12 PROBLEM — R13.19 OTHER DYSPHAGIA: Status: ACTIVE | Noted: 2018-01-01

## 2018-03-12 PROBLEM — K21.9 GASTROESOPHAGEAL REFLUX DISEASE WITHOUT ESOPHAGITIS: Status: ACTIVE | Noted: 2018-01-01

## 2018-03-12 PROBLEM — K59.09 OTHER CONSTIPATION: Status: ACTIVE | Noted: 2018-01-01

## 2018-03-12 NOTE — PROGRESS NOTES
[facility]  PRIMARY CARE PROVIDER AND CLINIC:  Martín Chiu [address] [place] [address] / [facility] [address]    HPI:    Tian Espitia is a 86 year old ([birth year]) seen today at [facility].   Was previously an [facility] patient and recently opted to establish care with [facility].      HPI information obtained from: facility chart records, facility staff, patient report and [facility] chart review.    Current issues are:       Bipolar affective disorder, currently depressed, moderate (H)  Late onset Alzheimer's disease without behavioral disturbance  Altered mental status, unspecified altered mental status type  Depressive disorder  Other dysphagia  Hypothyroidism, unspecified type  Gastroesophageal reflux disease without esophagitis  Other constipation  Chronic kidney disease, unspecified CKD stage     Patient with above Hx/Dx, previously followed by Dr. Diallo at [facility], now transferred to  team, has resided at [facility] for approximately 3 years, status is stable, no acute concerns, increased incontinence, moderately confused with intermittent frustrated episodes due to inability to accomplish ADL's independently, redirectable, requires direction, mild UE tremor in L hand/arm, overall status is stable with age-related cognitive and physical decline.       CODE STATUS/ADVANCE DIRECTIVES DISCUSSION:   DNR DNI/comfort cares    Patient's living condition: lives in a nursing home    ALLERGIES:Review of patient's allergies indicates no known allergies.  PAST MEDICAL HISTORY:  has a past medical history of Alzheimer disease; Bipolar disorder (H); CKD (chronic kidney disease); Depressive disorder; Hyperlipemia; Mental disorder; and Paralysis agitans (H).  PAST SURGICAL HISTORY:  has a past surgical history that includes IR Gastro Jejunostomy Tube Placement (01/04/2008) and hernia repair.  FAMILY HISTORY: family history includes CEREBROVASCULAR DISEASE in his  mother.  SOCIAL HISTORY:  reports that he has never smoked. He has never used smokeless tobacco. He reports that he does not drink alcohol or use illicit drugs.    Post Discharge Medication Reconciliation Status: discharge medications reconciled, continue medications without change.  Current Outpatient Prescriptions   Medication Sig Dispense Refill     ASPIRIN PO Take 81 mg by mouth At Bedtime       venlafaxine (EFFEXOR-XR) 75 MG 24 hr capsule Take 75 mg by mouth daily       fluticasone (FLONASE) 50 MCG/ACT spray Spray 1 spray into both nostrils daily       levothyroxine (SYNTHROID/LEVOTHROID) 100 MCG tablet Take 100 mcg by mouth daily       polyethylene glycol (MIRALAX/GLYCOLAX) powder Take 17 g by mouth daily       ranitidine (ZANTAC) 150 MG tablet Take 150 mg by mouth At Bedtime       ACETAMINOPHEN PO Take 1,000 mg by mouth 2 times daily       Divalproex Sodium (DEPAKOTE PO) Take 125 mg by mouth 2 times daily       hypromellose (ARTIFICIAL TEARS) 0.4 % SOLN ophthalmic solution Place 1 drop into both eyes 4 times daily as needed for dry eyes       bisacodyl (DULCOLAX) 10 MG Suppository Place 10 mg rectally daily as needed for constipation         ROS:  Unobtainable secondary to cognitive impairment.     Exam:  /77  Pulse 70  Temp 96.5  F (35.8  C)  Resp 12  Wt 155 lb 12 oz (70.6 kg)  SpO2 96%  GENERAL APPEARANCE:  Alert, in no distress, thin  ENT:  Mouth and posterior oropharynx normal, moist mucous membranes, normal hearing acuity  RESP:  lungs clear to auscultation , no respiratory distress  CV:  no edema, irregular rhythm rate  ABDOMEN:  no guarding or rebound, bowel sounds normal  M/S:   Gait and station abnormal weak, gait disturbance, ambulates  SKIN:  Inspection of skin and subcutaneous tissue baseline  NEURO:   Examination of sensation by touch normal  PSYCH:  oriented to self, memory impaired     Lab/Diagnostic data:  None on file.  None in Care Everywhere.      ASSESSMENT/PLAN:  Bipolar  affective disorder, currently depressed, moderate (H)  Late onset Alzheimer's disease without behavioral disturbance  Altered mental status, unspecified altered mental status type  Depressive disorder  -patient today stable, pleasantly confused, no acute concerns  -continue venlafaxine 75mg Qd  -continue divalproex 125mg BID  - acid on 9/8/17 was 11.8 (range )  -will check  acid level on Mon 3/26/18  -consider wean of depakote as indicated, due for GDR      Other dysphagia  -patient having some difficulty with swallowing  -ST ordered on 3/6/18 to evaluate swallow issues and diet    Hypothyroidism, unspecified type  -TSH on 9/8/17 was 0.79  -continue levothyroxine 100mcg Qd  -will recheck TSH on 3/26/18     Gastroesophageal reflux disease without esophagitis  -patient has no s/s gut disturbances, stable  -continue ranitidine 150mg Qhs    Other constipation  -in past few months patient has had complaint of constipation  -started on miralax Qd, also has dulcolax rectal  -DC dulcolax rectal  -start senokot 8.6/50 tabs BID PRN for constipation     Chronic kidney disease, unspecified CKD stage  -Creat on 2/10/18 was 1.09  -dose medications renally as possible  -recheck BMP on 3/26/18 (along with Hgb)    Electronically signed by:  CELSA Crowell CNP

## 2018-04-02 NOTE — PROGRESS NOTES
[facility]  PRIMARY CARE PROVIDER AND CLINIC:  Martín Cihu [address] [place] [address] / [facility] [address]  Chief Complaint   Patient presents with     Clinic Care Coordination - Initial       HPI:    Tian Espitia is a 86 year old ([birth year]) seen today at [facility].   Was previously an [facility] patient and recently opted to establish care with [facility].   HPI information obtained from: facility staff, patient report and [facility] chart review.      Current issues are:      - Resident with PMH significant for AD, BPD, CKD, used to see Dr. Diallo at [facility], now transferred care to [facility] team at the facility.   -  Resident seen and examined. Reports sleep, appetite and BM are fine.   - GNP has no concern today.   - RN reports progressing dementia, requires help with assistance with all ADLs, new with new BI, on pureed diet thin liquid.       CODE STATUS/ADVANCE DIRECTIVES DISCUSSION:   DNR/DNI/comfort cares    Patient's living condition: lives in a nursing home    ALLERGIES:Review of patient's allergies indicates no known allergies.  PAST MEDICAL HISTORY:  has a past medical history of Alzheimer disease; Bipolar disorder (H); CKD (chronic kidney disease); Depressive disorder; Hyperlipemia; Mental disorder; and Paralysis agitans (H).  PAST SURGICAL HISTORY:  has a past surgical history that includes IR Gastro Jejunostomy Tube Placement (01/04/2008) and hernia repair.  FAMILY HISTORY: family history includes CEREBROVASCULAR DISEASE in his mother.  SOCIAL HISTORY:  reports that he has never smoked. He has never used smokeless tobacco. He reports that he does not drink alcohol or use illicit drugs.    Post Discharge Medication Reconciliation Status: discharge medications reconciled and changed, per note/orders (see AVS).  Current Outpatient Prescriptions   Medication Sig Dispense Refill     LEVOTHYROXINE SODIUM PO Take 88 mcg by mouth daily       ASPIRIN PO  Take 81 mg by mouth At Bedtime       venlafaxine (EFFEXOR-XR) 75 MG 24 hr capsule Take 75 mg by mouth daily       fluticasone (FLONASE) 50 MCG/ACT spray Spray 1 spray into both nostrils daily       polyethylene glycol (MIRALAX/GLYCOLAX) powder Take 17 g by mouth daily       ranitidine (ZANTAC) 150 MG tablet Take 150 mg by mouth At Bedtime       ACETAMINOPHEN PO Take 1,000 mg by mouth 2 times daily       Divalproex Sodium (DEPAKOTE PO) Take 125 mg by mouth 2 times daily       hypromellose (ARTIFICIAL TEARS) 0.4 % SOLN ophthalmic solution Place 1 drop into both eyes 4 times daily as needed for dry eyes         ROS:  Unobtainable secondary to cognitive impairment.     Exam:  /68  Pulse 56  Temp 98  F (36.7  C)  Resp 22  Wt 155 lb 4 oz (70.4 kg)  SpO2 96%  GENERAL APPEARANCE:  in no distress, cooperative  ENT:  Mouth and posterior oropharynx normal, moist mucous membranes  EYES:  EOM, conjunctivae, lids, pupils and irises normal  RESP:  respiratory effort and palpation of chest normal, lungs clear to auscultation , no respiratory distress  CV:  Palpation and auscultation of heart done , regular rate and rhythm, no murmur, rub, or gallop  ABDOMEN:  normal bowel sounds, soft, nontender, no hepatosplenomegaly or other masses  M/S:   Gait and station abnormal uses wC  SKIN:  Inspection of skin and subcutaneous tissue baseline, Palpation of skin and subcutaneous tissue baseline  NEURO:   no NFd appreciated on observation  PSYCH:  oriented to self, otherwise pleasantly confused.     Lab/Diagnostic data:   Last Basic Metabolic Panel:  Recent Labs   Lab Test 03/26/18   NA  142   POTASSIUM  4.9   CHLORIDE  109*   TONYA  9.2   CO2  25   BUN  29*   CR  1.15   GLC  63*       TSH   Date Value Ref Range Status   03/26/2018 0.78 0.30 - 5.00 uIU/mL Final     ASSESSMENT/PLAN:  Bipolar affective disorder, currently depressed, moderate (H)  Late onset Alzheimer's disease without behavioral disturbance  - stable on meds,  continue  - Continue to anticipate needs. Chronic condition, ongoing decline expected.   -  Continue to provide redirection and reassurance as needed. Maintain safe living situation with goals focused on comfort.      Other dysphagia  - SLP on the case,     Hypothyroidism, unspecified type  TSH   Date Value Ref Range Status   03/26/2018 0.78 0.30 - 5.00 uIU/mL Final    - LT4 reduced from 100 mcg to 88 mcg  - follow on TSH result, keep level around 5 in this frail elderly    Gastroesophageal reflux disease without esophagitis  -stable, continue Ranitidine 150mg Qhs     Other constipation  -will adjust meds for optimal result     Chronic kidney disease, stage 2  - - Avoid nephrotoxic drugs  - Renal dose the medications.     Orders:  1.  Senna 8.6 mg tab.  Take 2 tabs BID PRN for constipation, hold for watery stools or diarrhea.  2. Consider stopping ASA, likely has been given for primary preventive reason.         Electronically signed by:  Derick Long MD

## 2018-04-04 NOTE — LETTER
4/4/2018        RE: Tian Espitia  [address] [address]        [facility]  PRIMARY CARE PROVIDER AND CLINIC:  Apple Martín Felix [address] [place] [address] / [facility] [address]  Chief Complaint   Patient presents with     Clinic Care Coordination - Initial       HPI:    Tian Espitia is a 86 year old ([birth year]) seen today at [facility].   Was previously an [facility] patient and recently opted to establish care with [facility].   HPI information obtained from: facility staff, patient report and [facility] chart review.      Current issues are:      - Resident with PMH significant for AD, BPD, CKD, used to see Dr. Diallo at [facility], now transferred care to [facility] team at the facility.   -  Resident seen and examined. Reports sleep, appetite and BM are fine.   - GNP has no concern today.   - RN reports progressing dementia, requires help with assistance with all ADLs, new with new BI, on pureed diet thin liquid.       CODE STATUS/ADVANCE DIRECTIVES DISCUSSION:   DNR/DNI/comfort cares    Patient's living condition: lives in a nursing home    ALLERGIES:Review of patient's allergies indicates no known allergies.  PAST MEDICAL HISTORY:  has a past medical history of Alzheimer disease; Bipolar disorder (H); CKD (chronic kidney disease); Depressive disorder; Hyperlipemia; Mental disorder; and Paralysis agitans (H).  PAST SURGICAL HISTORY:  has a past surgical history that includes IR Gastro Jejunostomy Tube Placement (01/04/2008) and hernia repair.  FAMILY HISTORY: family history includes CEREBROVASCULAR DISEASE in his mother.  SOCIAL HISTORY:  reports that he has never smoked. He has never used smokeless tobacco. He reports that he does not drink alcohol or use illicit drugs.    Post Discharge Medication Reconciliation Status: discharge medications reconciled and changed, per note/orders (see AVS).  Current Outpatient Prescriptions   Medication Sig Dispense  Refill     LEVOTHYROXINE SODIUM PO Take 88 mcg by mouth daily       ASPIRIN PO Take 81 mg by mouth At Bedtime       venlafaxine (EFFEXOR-XR) 75 MG 24 hr capsule Take 75 mg by mouth daily       fluticasone (FLONASE) 50 MCG/ACT spray Spray 1 spray into both nostrils daily       polyethylene glycol (MIRALAX/GLYCOLAX) powder Take 17 g by mouth daily       ranitidine (ZANTAC) 150 MG tablet Take 150 mg by mouth At Bedtime       ACETAMINOPHEN PO Take 1,000 mg by mouth 2 times daily       Divalproex Sodium (DEPAKOTE PO) Take 125 mg by mouth 2 times daily       hypromellose (ARTIFICIAL TEARS) 0.4 % SOLN ophthalmic solution Place 1 drop into both eyes 4 times daily as needed for dry eyes         ROS:  Unobtainable secondary to cognitive impairment.     Exam:  /68  Pulse 56  Temp 98  F (36.7  C)  Resp 22  Wt 155 lb 4 oz (70.4 kg)  SpO2 96%  GENERAL APPEARANCE:  in no distress, cooperative  ENT:  Mouth and posterior oropharynx normal, moist mucous membranes  EYES:  EOM, conjunctivae, lids, pupils and irises normal  RESP:  respiratory effort and palpation of chest normal, lungs clear to auscultation , no respiratory distress  CV:  Palpation and auscultation of heart done , regular rate and rhythm, no murmur, rub, or gallop  ABDOMEN:  normal bowel sounds, soft, nontender, no hepatosplenomegaly or other masses  M/S:   Gait and station abnormal uses wC  SKIN:  Inspection of skin and subcutaneous tissue baseline, Palpation of skin and subcutaneous tissue baseline  NEURO:   no NFd appreciated on observation  PSYCH:  oriented to self, otherwise pleasantly confused.     Lab/Diagnostic data:   Last Basic Metabolic Panel:  Recent Labs   Lab Test 03/26/18   NA  142   POTASSIUM  4.9   CHLORIDE  109*   TONYA  9.2   CO2  25   BUN  29*   CR  1.15   GLC  63*       TSH   Date Value Ref Range Status   03/26/2018 0.78 0.30 - 5.00 uIU/mL Final     ASSESSMENT/PLAN:  Bipolar affective disorder, currently depressed, moderate (H)  Late onset  Alzheimer's disease without behavioral disturbance  - stable on meds, continue  - Continue to anticipate needs. Chronic condition, ongoing decline expected.   -  Continue to provide redirection and reassurance as needed. Maintain safe living situation with goals focused on comfort.      Other dysphagia  - SLP on the case,     Hypothyroidism, unspecified type  TSH   Date Value Ref Range Status   03/26/2018 0.78 0.30 - 5.00 uIU/mL Final    - LT4 reduced from 100 mcg to 88 mcg  - follow on TSH result, keep level around 5 in this frail elderly    Gastroesophageal reflux disease without esophagitis  -stable, continue Ranitidine 150mg Qhs     Other constipation  -will adjust meds for optimal result     Chronic kidney disease, stage 2  - - Avoid nephrotoxic drugs  - Renal dose the medications.     Orders:  1.  Senna 8.6 mg tab.  Take 2 tabs BID PRN for constipation, hold for watery stools or diarrhea.  2. Consider stopping ASA, likely has been given for primary preventive reason.         Electronically signed by:  Derick Long MD                    Sincerely,        Derick Long MD

## 2018-05-04 NOTE — PROGRESS NOTES
Supplemental benefit information shared with member.    [facility]   [facility]  [phone]

## 2018-05-07 NOTE — PROGRESS NOTES
[facility]    Chief Complaint   Patient presents with     group home Regulatory       [facility] Medical Record Number:  [id number]    HPI:    Tian Espitia is a 86 year old  (1/20/1932), who is being seen today for a federally mandated E/M visit at [facility].  HPI information obtained from: facility chart records, facility staff, patient report and [facility] chart review. Today's concerns are:     Bipolar disorder in partial remission, most recent episode unspecified type (H)  Late onset Alzheimer's disease without behavioral disturbance  Hypothyroidism, unspecified type  Gastroesophageal reflux disease without esophagitis     Patient has been stable, per staff no changes in status and/or behavior, recent labs as below, nutrition intake appropriate with no gut disturbances, does wander at times, makes comments regarding staff sometimes racial, shakes fist at certain staff, resistive to feeding and cares, declines participation in activities, no overt concerns.     ALLERGIES: Review of patient's allergies indicates no known allergies.  PAST MEDICAL HISTORY:  has a past medical history of Alzheimer disease; Bipolar disorder (H); CKD (chronic kidney disease); Depressive disorder; Hyperlipemia; Mental disorder; and Paralysis agitans (H).  PAST SURGICAL HISTORY:  has a past surgical history that includes IR Gastro Jejunostomy Tube Placement (01/04/2008) and hernia repair.  FAMILY HISTORY: family history includes CEREBROVASCULAR DISEASE in his mother.  SOCIAL HISTORY:  reports that he has never smoked. He has never used smokeless tobacco. He reports that he does not drink alcohol or use illicit drugs.    MEDICATIONS:  Current Outpatient Prescriptions   Medication Sig Dispense Refill     ACETAMINOPHEN PO Take 1,000 mg by mouth 2 times daily       ASPIRIN PO Take 81 mg by mouth At Bedtime       fluticasone (FLONASE) 50 MCG/ACT spray Spray 1 spray into both nostrils daily       hypromellose  (ARTIFICIAL TEARS) 0.4 % SOLN ophthalmic solution Place 1 drop into both eyes 4 times daily as needed for dry eyes       LEVOTHYROXINE SODIUM PO Take 88 mcg by mouth daily       polyethylene glycol (MIRALAX/GLYCOLAX) powder Take 17 g by mouth daily       sennosides (SENOKOT) 8.6 MG tablet Take 2 tablets by mouth 2 times daily as needed for constipation       venlafaxine (EFFEXOR-XR) 75 MG 24 hr capsule Take 75 mg by mouth daily       Medications reviewed:  Medications reconciled to facility chart and changes were made to reflect current medications as identified as above med list. Below are the changes that were made:   Medications stopped since last EPIC medication reconciliation:   There are no discontinued medications.    Medications started since last [facility] medication reconciliation:  No orders of the defined types were placed in this encounter.      Case Management:  I have reviewed the care plan and MDS and do agree with the plan. Patient's desire to return to the community is present, but is not able due to care needs .  Information reviewed:  Medications, vital signs, orders, and nursing notes    The health plan new enrollment has happened. I have reviewed the  MDS, the preventative needs,  and facility care plan. The level of care is appropriate. I have reviewed the code status/advanced directives.     Care Conference  The most recent care conference was done on approximately 4/16/18 and I have reviewed the notes.     Member/Responsible Party Interview:  Person answering below questions: patient/resident  1. What are the most important things to you? bowling  2. What activities do you enjoy doing? bowling  3. Do you like where you live?(if no, explain what you would change) Yes   4. Would you like to live elsewhere? No    I communicated with patients contact Katey, on the phone. I discussed the plan of care and gave my direct cell # call back if any further questions.    ROS:  Limited secondary to cognitive  impairment but today pt reports enjoys being here.    Exam:  Vitals: /85  Pulse 75  Temp 97.1  F (36.2  C)  Resp 20  Wt 155 lb (70.3 kg)  SpO2 98%  BMI= There is no height or weight on file to calculate BMI.  GENERAL APPEARANCE:  Alert, in no distress, appears healthy  ENT:  Mouth and posterior oropharynx normal, moist mucous membranes  RESP:  lungs clear to auscultation , no respiratory distress  CV:  Palpation and auscultation of heart done , no edema, irregular rhythm rate  ABDOMEN:  bowel sounds normal  M/S:   Gait and station abnormal requires assist, ADL support  SKIN:  Inspection of skin and subcutaneous tissue baseline  NEURO:   Examination of sensation by touch normal  PSYCH:  oriented to self?    Lab/Diagnostic data:     CBC RESULTS: No results for input(s): WBC, RBC, HGB, HCT, MCV, MCH, MCHC, RDW, PLT in the last 45228 hours.    Last Basic Metabolic Panel:  Recent Labs   Lab Test 03/26/18   NA  142   POTASSIUM  4.9   CHLORIDE  109*   TONYA  9.2   CO2  25   BUN  29*   CR  1.15   GLC  63*       Liver Function Studies - No results for input(s): PROTTOTAL, ALBUMIN, BILITOTAL, ALKPHOS, AST, ALT, BILIDIRECT in the last 68094 hours.    TSH   Date Value Ref Range Status   03/26/2018 0.78 0.30 - 5.00 uIU/mL Final   ]    No results found for: A1C    ASSESSMENT/PLAN  Bipolar disorder in partial remission, most recent episode unspecified type (H)  Late onset Alzheimer's disease without behavioral disturbance  -patient has no overt s/s depression  -behaviors mild/moderate with intermittent verbal comments  -staff able to assist/redirect   -depakote DC'd on 4/23/18, was on 125mg BID, of note VPA on 3/26 was 12.5 (range )  -continue venlafaxine ER 75mg Qd  -if having increase in cognitive changes/declien would consider increase in venlafaxine before initiation of psych medication    Hypothyroidism, unspecified type  -TSH on 3/26/18 was 0.78  -decreased levothyroxine from 100 to 88mcg  -plan to recheck TSH  in 6-12 months    Gastroesophageal reflux disease without esophagitis  -patient having no s/s gut disturbances, intake appropriate  -DC ranitidine today  -if having gut issues consider addition of CaCarb PRN    Family communication: spoke with spouse Katey today on phone, reviewed status, meds, and plan above, Katey concurs.      Electronically signed by:  CELSA Crowell CNP

## 2018-05-10 PROBLEM — E03.9 HYPOTHYROIDISM, UNSPECIFIED TYPE: Chronic | Status: ACTIVE | Noted: 2018-01-01

## 2018-05-10 PROBLEM — N17.9 AKI (ACUTE KIDNEY INJURY) (H): Status: ACTIVE | Noted: 2018-01-01

## 2018-05-10 PROBLEM — K21.9 GASTROESOPHAGEAL REFLUX DISEASE WITHOUT ESOPHAGITIS: Chronic | Status: ACTIVE | Noted: 2018-01-01

## 2018-05-10 PROBLEM — N18.9 CHRONIC KIDNEY DISEASE, UNSPECIFIED CKD STAGE: Chronic | Status: ACTIVE | Noted: 2018-01-01

## 2018-05-10 PROBLEM — J69.0 ASPIRATION PNEUMONIA (H): Status: ACTIVE | Noted: 2018-01-01

## 2018-05-10 NOTE — IP AVS SNAPSHOT
` ` Patient Information     Patient Name Sex     Tian Espitia ([id number]) Male 1932       Room Bed    2401 2401-      Patient Demographics     Address Phone    [address] [place] [id number] [phone] (Home)      Patient Ethnicity & Race     Ethnic Group Patient Race    American White      Emergency Contact(s)     Name Relation Home Work Mobile    DAVE ESPITIA Spouse [phone]      Documents on File        Status Date Received Description       Documents for the Patient    Consent for EHR Access Received 05/10/18     Insurance Card Received 05/10/18     External Medication Information Consent       Patient ID Scan Refused 05/10/18     [facility] Specified Other       Consent for Services/Privacy Notice - Hospital/Clinic       Privacy Notice - [place] Received 05/10/18     Care Everywhere Prospective Auth       Advance Directives and Living Will Received 18 POLST 2018    Consent for Services - Geriatrics Received 18     Consent for Services - Hospital and Clinic Received 05/10/18     HIE Auth Received 05/10/18        Documents for the Encounter    CMS IM for Patient Signature Received 05/10/18     Observation Notice Received 18     ECG   ECG Report      Admission Information     Attending Provider Admitting Provider Admission Type Admission Date/Time    Alexandro Bakns MD Kampfe, Kevin L, MD Emergency 05/10/18  190    Discharge Date Hospital Service Auth/Cert Status Service Area     [facility]    Unit Room/Bed Admission Status       [facility]240 Admission (Confirmed)       Admission     Complaint    Aspiration pneumonia (H), Aspiration pneumonia (H)      Hospital Account     Name Acct ID Class Status Primary Coverage    Tian Espitia [id number] Observation Open BLUE PLUS - BLUE PLUS SECURE BLUE            Guarantor Account (for Hospital Account #[id number])     Name Relation to Pt Service Area  Active? Acct Type    Tian Espitia Self FCS Yes Personal/Family    Address Phone          [address] [place] [address] [phone](H)              Coverage Information (for Hospital Account #[id number])     F/O Payor/Plan Precert #    BLUE PLUS/BLUE PLUS SECURE BLUE     Subscriber Subscriber #    Tian Espitia [id number]    Address Phone    [address] [address]  [place] [address] [phone]

## 2018-05-10 NOTE — TELEPHONE ENCOUNTER
"Called by nursing staff at [facility] about patient who is coughing up lots of clear phlegm, \"doesn't look right, or like his usual self\"     HR irreg (new) varying 30-140s, currently 111  /90  O2 sat 86-97% on room air  RR 16-24     PLAN: okay to send to ER for tachycardia, new irregular HR, cough and hypoxia.     Candace Pelaez MD   "

## 2018-05-10 NOTE — IP AVS SNAPSHOT
"    [facility] SURGICAL: [phone]                                              INTERAGENCY TRANSFER FORM - PHYSICIAN ORDERS   5/10/2018                    Hospital Admission Date: 5/10/2018  MARIO LUONG   : 1932  Sex: Male        Attending Provider: Alexandro Banks MD     Allergies:  No Known Allergies    Infection:  None   Service:  GENERAL MEDI    Ht:  1.727 m (5' 8\")   Wt:  63.5 kg (139 lb 15.9 oz)   Admission Wt:  63.5 kg (139 lb 15.9 oz)    BMI:  21.29 kg/m 2   BSA:  1.75 m 2            Patient PCP Information     Provider PCP Type    CELSA Crowell [facility] General      ED Clinical Impression     Diagnosis Description Comment Added By Time Added    Aspiration pneumonia due to inhalation of vomitus (H) [J69.0] Aspiration pneumonia due to inhalation of vomitus (H) [J69.0]  Osbaldo Clancy MD 5/10/2018 10:36 PM      Hospital Problems as of 2018              Priority Class Noted POA    Alzheimer's disease Medium  3/21/2007 Yes    Bipolar disorder (H) Medium  3/21/2007 Yes    Depressive disorder Medium  3/21/2007 Yes    Transient cerebral ischemia Medium  3/21/2007 Yes    * (Principal)Pneumonitis due to inhalation of food or vomitus (H) Medium  2007 Yes    Hypothyroidism, unspecified type Medium  3/12/2018 Yes    Gastroesophageal reflux disease without esophagitis Medium  3/12/2018 Yes    Chronic kidney disease, unspecified CKD stage Medium  3/12/2018 Yes    KARLIE (acute kidney injury) (H) Medium  5/10/2018 Yes    Aspiration pneumonia (H) Medium  5/10/2018 Yes      Non-Hospital Problems as of 2018              Priority Class Noted    Rash and other nonspecific skin eruption Medium  2012    Other dysphagia Medium  3/12/2018    Other constipation Medium  3/12/2018      Code Status History     This patient does not have a recorded code status. Please follow your organizational policy for patients in this situation.         Medication Review      START taking        " Dose / Directions Comments    amoxicillin-clavulanate 500-125 MG per tablet   Commonly known as:  AUGMENTIN   Used for:  Aspiration pneumonitis (H)        Dose:  1 tablet   Take 1 tablet by mouth 2 times daily for 5 days   Quantity:  20 tablet   Refills:  0          CONTINUE these medications which have NOT CHANGED        Dose / Directions Comments    ACETAMINOPHEN PO        Dose:  1000 mg   Take 1,000 mg by mouth 2 times daily   Refills:  0        ARTIFICIAL TEARS 0.4 % Soln ophthalmic solution   Generic drug:  hypromellose        Dose:  1 drop   Place 1 drop into both eyes 4 times daily as needed for dry eyes   Refills:  0        ASPIRIN PO        Dose:  81 mg   Take 81 mg by mouth At Bedtime   Refills:  0        fluticasone 50 MCG/ACT spray   Commonly known as:  FLONASE        Dose:  1 spray   Spray 1 spray into both nostrils daily   Refills:  0        LEVOTHYROXINE SODIUM PO        Dose:  88 mcg   Take 88 mcg by mouth daily   Refills:  0        polyethylene glycol powder   Commonly known as:  MIRALAX/GLYCOLAX        Dose:  17 g   Take 17 g by mouth daily   Refills:  0        sennosides 8.6 MG tablet   Commonly known as:  SENOKOT        Dose:  2 tablet   Take 2 tablets by mouth 2 times daily as needed for constipation   Refills:  0        venlafaxine 75 MG 24 hr capsule   Commonly known as:  EFFEXOR-XR        Dose:  75 mg   Take 75 mg by mouth daily   Refills:  0                After Care     Activity - Up with nursing assistance           Advance Diet as Tolerated       Follow this diet upon discharge: Regular - pureed solids and nectar consistency liquids.   (Dysphagia diet level 1/nectar)       General info for SNF       Length of Stay Estimate: Long Term Care  Condition at Discharge: Improving  Level of care:skilled   Rehabilitation Potential: Poor  Admission H&P remains valid and up-to-date: Yes  Recent Chemotherapy: N/A  Use Nursing Home Standing Orders: Yes             Statement of Approval     Ordered           05/11/18 1119  I have reviewed and agree with all the recommendations and orders detailed in this document.  EFFECTIVE NOW     Approved and electronically signed by:  Alexandro Banks MD

## 2018-05-10 NOTE — IP AVS SNAPSHOT
"` `           [facility] SURGICAL: [phone]                                              INTERAGENCY TRANSFER FORM - NURSING   5/10/2018                    Hospital Admission Date: 5/10/2018  MARIO LUONG   : 1932  Sex: Male        Attending Provider: Alexandro Banks MD     Allergies:  No Known Allergies    Infection:  None   Service:  GENERAL MEDI    Ht:  1.727 m (5' 8\")   Wt:  63.5 kg (139 lb 15.9 oz)   Admission Wt:  63.5 kg (139 lb 15.9 oz)    BMI:  21.29 kg/m 2   BSA:  1.75 m 2            Patient PCP Information     Provider PCP Type    Martín hCiu, APRN CNP General      Current Code Status     This patient does not have a recorded code status. Please follow your organizational policy for patients in this situation.      Code Status History     This patient does not have a recorded code status. Please follow your organizational policy for patients in this situation.      Advance Directives        Scanned docmt in ACP Activity?           Yes, scanned ACP docmt        Hospital Problems as of 2018              Priority Class Noted POA    Alzheimer's disease Medium  3/21/2007 Yes    Bipolar disorder (H) Medium  3/21/2007 Yes    Depressive disorder Medium  3/21/2007 Yes    Transient cerebral ischemia Medium  3/21/2007 Yes    * (Principal)Pneumonitis due to inhalation of food or vomitus (H) Medium  2007 Yes    Hypothyroidism, unspecified type Medium  3/12/2018 Yes    Gastroesophageal reflux disease without esophagitis Medium  3/12/2018 Yes    Chronic kidney disease, unspecified CKD stage Medium  3/12/2018 Yes    KARLIE (acute kidney injury) (H) Medium  5/10/2018 Yes    Aspiration pneumonia (H) Medium  5/10/2018 Yes      Non-Hospital Problems as of 2018              Priority Class Noted    Rash and other nonspecific skin eruption Medium  2012    Other dysphagia Medium  3/12/2018    Other constipation Medium  3/12/2018      Immunizations     None         END    " "  ASSESSMENT     Discharge Profile Flowsheet     EXPECTED DISCHARGE     Transportation Agency  [employer] 05/11/18 1113    Expected Discharge Date  05/12/18 05/11/18 1036   Skilled Nursing Facility  [facility] [phone], Fax: [phone] 05/11/18 1036    DISCHARGE NEEDS ASSESSMENT     SKIN      Primary Care Clinic Name  [facility] 05/11/18 1036   Inspection of bony prominences  Full 05/10/18 2336    Primary Care MD Name  Martín Chiu 05/11/18 1036   Inspection under devices  Focused Inspection (identify device(s) inspected) 05/10/18 2336    GASTROINTESTINAL (ADULT,PEDIATRIC,OB)     Focused inspection under devices  Glasses 05/10/18 2336    GI WDL  WDL 05/10/18 2336   Skin WDL  WDL;characteristics 05/10/18 2336    Passing flatus  yes 05/10/18 2336   Skin Temperature  warm 05/10/18 2336    COMMUNICATION ASSESSMENT     Skin Moisture  dry 05/10/18 2336    Patient's communication style  spoken language (English or Bilingual) 05/11/18 0111   Skin Integrity  -- (pink birthmark to back of neck) 05/10/18 2336    FINAL RESOURCES     SAFETY      Resources List  Skilled Nursing Facility 05/11/18 1036   Safety WDL  WDL 05/10/18 2336    Other Resources  Transportation Services 05/11/18 1113   All Alarms  alarm(s) activated and audible 05/11/18 0851                 Assessment WDL (Within Defined Limits) Definitions           Safety WDL     Effective: 09/28/15    Row Information: <b>WDL Definition:</b> Bed in low position, wheels locked; call light in reach; upper side rails up x 2; ID band on<br> <font color=\"gray\"><i>Item=AS safety wdl>>List=AS safety wdl>>Version=F14</i></font>      Skin WDL     Effective: 09/28/15    Row Information: <b>WDL Definition:</b> Warm; dry; intact; elastic; without discoloration; pressure points without redness<br> <font color=\"gray\"><i>Item=AS skin wdl>>List=AS skin wdl>>Version=F14</i></font>      Vitals     Vital Signs Flowsheet     VITAL SIGNS     BSA " "(Calculated - sq m)  1.75 05/10/18 2329    Temp  98.5  F (36.9  C) 05/11/18 1107   BMI (Calculated)  21.33 05/10/18 2329    Temp src  Oral 05/11/18 1107   EKG MONITORING      Resp  20 05/11/18 1107   Cardiac Regularity  Irregular (irregularly irregular) 05/10/18 1915    Pulse  70 05/11/18 1107   Cardiac Rhythm  Atrial fibrillation (some sinus beats; frequent ectopy, some a flutter appearing; remains dynamic. ) 05/10/18 1915    Heart Rate  71 05/11/18 0348   SHANE COMA SCALE      Pulse/Heart Rate Source  Monitor 05/11/18 1107   Best Eye Response  4-->(E4) spontaneous 05/10/18 2253    BP  146/81 05/11/18 1107   Best Motor Response  6-->(M6) obeys commands 05/10/18 2253    BP Location  Right arm 05/11/18 1107   Best Verbal Response  5-->(V5) oriented 05/10/18 2253    OXYGEN THERAPY     Flatgap Coma Scale Score  15 05/10/18 2253    SpO2  97 % 05/11/18 1107   POSITIONING      O2 Device  None (Room air) 05/11/18 1107   Body Position  independently positioning 05/11/18 0348    PAIN/COMFORT     Head of Bed (HOB)  HOB at 20-30 degrees 05/10/18 2336    Preferred Pain Scale  CAPA (Clinically Aligned Pain Assessment) ([facility] Adults Only) 05/10/18 2342   Positioning/Transfer Devices  pillows;in use 05/10/18 2336    CLINICALLY ALIGNED PAIN ASSESSMENT (CAPA) ([facility] ADULTS ONLY)     Chair  Recline and up in chair 05/11/18 1122    Comfort  comfortably manageable (appears comfortable) 05/10/18 2342   DAILY CARE      HEIGHT AND WEIGHT     Activity Management  ambulated to bathroom;up in chair 05/11/18 0903    Height  1.727 m (5' 8\") (per RN) 05/10/18 2329   Activity Assistance Provided  assistance, 1 person 05/11/18 0903    Weight  63.5 kg (139 lb 15.9 oz) 05/10/18 2329   Assistive Device Utilized  walker 05/10/18 2336            Patient Lines/Drains/Airways Status    Active LINES/DRAINS/AIRWAYS     Name: Placement date: Placement time: Site: Days: Last dressing change:    Peripheral IV " 05/10/18 Left Lower forearm 05/10/18   2200   Lower forearm   less than 1             Patient Lines/Drains/Airways Status    Active PICC/CVC     None            Intake/Output Detail Report     Date Intake     Output Net    Shift P.O. I.V. IV Piggyback Total Urine Total       Noc 05/09/18 2300 - 05/10/18 0659 -- -- -- -- -- -- 0    Day 05/10/18 0700 - 05/10/18 1459 -- -- -- -- -- -- 0    Yakelin 05/10/18 1500 - 05/10/18 2259 -- -- -- -- -- -- 0    Noc 05/10/18 2300 - 05/11/18 0659 30 600 -- 630 200 200 430    Day 05/11/18 0700 - 05/11/18 1459 30 243 -- 273 -- -- 273      Last Void/BM       Most Recent Value    Urine Occurrence 1 at 05/11/2018 0845    Stool Occurrence       Case Management/Discharge Planning     Case Management/Discharge Planning Flowsheet     REFERRAL INFORMATION     Support Assessment  Adequate family and caregiver support 05/11/18 1036    Did the Initial Social Work Assessment result in a Social Work Case?  No 05/11/18 1036   COPING/STRESS      Reason For Consult  other (see comments) (Nursing Home LTC Resident) 05/11/18 1036   Major Change/Loss/Stressor  none 05/11/18 0111    Primary Care Clinic Name  [facility] 05/11/18 1036   EXPECTED DISCHARGE      Primary Care MD Name  Martín Chiu 05/11/18 1036   Expected Discharge Date  05/12/18 05/11/18 1036    LIVING ENVIRONMENT     FINAL RESOURCES      Lives With  facility resident 05/11/18 1036   Resources List  Skilled Nursing Facility 05/11/18 1036    Living Arrangements  extended care facility 05/11/18 1036   Other Resources  Transportation Services 05/11/18 1113    Able to Return to Prior Living Arrangements  yes 05/11/18 1036   Transportation Agency  [employer] 05/11/18 1113    ASSESSMENT OF FAMILY/SOCIAL SUPPORT     Skilled Nursing Facility  [facility] [phone], Fax: [phone] 05/11/18 1036    Who is your support system?  Wife 05/11/18 1036   ABUSE RISK SCREEN      Description of Support System   Supportive;Involved 05/11/18 1036   OBSERVATION: Is there reason to believe there has been maltreatment of a vulnerable adult (ie. Physical/Sexual/Emotional abuse, self neglect, lack of adequate food, shelter, medical care, or financial exploitation)?  no 05/10/18 1461

## 2018-05-10 NOTE — IP AVS SNAPSHOT
` `     [facility] SURGICAL: [phone]                 INTERAGENCY TRANSFER FORM - NOTES (H&P, Discharge Summary, Consults, Procedures, Therapies)   5/10/2018                    Hospital Admission Date: 5/10/2018  TIAN ESPITIA   : 1932  Sex: Male        Patient PCP Information     Provider PCP Type    Martín Chiu APRN CNP General         History & Physicals      H&P by Mindi Fox PA-C at 5/10/2018  9:59 PM     Author:  Mindi Fox PA-C Service:  Hospitalist Author Type:  Physician Assistant Woodrow KOVACS    Filed:  5/10/2018 10:47 PM Date of Service:  5/10/2018  9:59 PM Creation Time:  5/10/2018  9:52 PM    Status:  Attested :  Mindi Fox PA-C (Physician Assistant - FERMÍN)    Cosigner:  Alexandro Banks MD at 2018  7:25 AM        Attestation signed by Alexandro Banks MD at 2018  7:25 AM        Physician Attestation   I, Alexandro Banks MD, have reviewed and discussed with the advanced practice provider their history, physical and plan for Tian Espitia. I did not participate in a shared visit by interviewing or examining the patient and this should be billed as an advanced practice provider only visit.    Alexandro Banks MD  Date of Service (when I saw the patient): I did not personally see this patient today.                               [facility]    History and Physical  Hospital Medicine       Date of Admission:  5/10/2018  Date of Service: 5/10/2018     Primary Care Physician   Martín Chiu [phone]    Assessment & Plan   Tian Espitia is a 86 year old male with PMH significant for Alzheimer's disease, Bipolar, CKD stage III, depression, GERD, hypothyroidism , hx of TIA who now presents with cough and hx of aspiration pneumonia.      Pneumonitis due to inhalation of food or vomitus (H)[CH1.1]    Leukocytosis[CH1.2]  Presents with cough after questionable choking episode[CH1.1] and nausea[CH1.2].  VS  reveal mild tachycardia.  KARLIE as below.  WBC 13.5.  EKG with left bundle branch block.  Unclear if this is new; no cardiac complaints.  CXR without infiltrate.  Given 500cc NS in ED.  Started on Unasyn.[CH1.1]  - order UA/UC[CH1.2]  - continue Unasyn Q6 hours  - consider repeat imaging  - IP speech eval ordered; NPO until evaluation  - consider palliative care IP consult    KARLIE (acute kidney injury) (H)  Chronic kidney disease, unspecified CKD stage  Creatinine 1.0 - 1.3. 1.5 on admission  - continue IVF  - BMP in AM    Alzheimer's disease  Bipolar disorder (H)  Depressive disorder  Severe dementia at baseline.  - continue PTA Effexor      Transient cerebral ischemia  - continue PTA aspirin      Hypothyroidism, unspecified type  - continue PTA levothyroxine      Gastroesophageal reflux disease without esophagitis  Not on medication       F: 50cc/hr of 0.9NS  E: BMP in AM  N: NPO pending speech swallow eval  DVT Prophylaxis: mechanical    Code Status: DNR / DNI    Disposition: Anticipate discharge in 2-3 days. Appropriate for inpatient care.    Case discussed with Dr. Alexandro Banks.  Assessment and plan as written above.    Mindi Fox PA-C  [facility]ist [facility]    History is obtained from the patient and review of the EMR.    Past Medical History    Past Medical History:   Diagnosis Date     Alzheimer disease      Bipolar disorder (H)      CKD (chronic kidney disease)      Depressive disorder      Hyperlipemia      Mental disorder      Paralysis agitans (H)        Past Surgical History   Past Surgical History:   Procedure Laterality Date     HERNIA REPAIR       IR GASTRO JEJUNOSTOMY TUBE PLACEMENT  01/04/2008       Family History    Family History   Problem Relation Age of Onset     CEREBROVASCULAR DISEASE Mother        History of Present Illness   Tian Espitia is a 86 year old male with PMH significant for Alzheimer's disease, Bipolar, CKD stage III, depression, GERD, hypothyroidism , hx of TIA who  "now presents with cough and hx of aspiration pneumonia.[CH1.1]    Patient is unable to provide any history due to baseline dementia.  Per the patient's wife, he was in his regular state of health this morning.  She went to [facility] and fed him lunch.  He had a slight cough at that point in time.  They received a call the evening stating he wasn't doing \"well\".  They were also told that he had one episode of nausea.    ROS: unable[CH1.2]    Prior to Admission Medications   Prior to Admission Medications   Prescriptions Last Dose Informant Patient Reported? Taking?   ACETAMINOPHEN PO 5/10/2018 at am  Yes Yes   Sig: Take 1,000 mg by mouth 2 times daily   ASPIRIN PO 5/9/2018 at hs  Yes Yes   Sig: Take 81 mg by mouth At Bedtime   LEVOTHYROXINE SODIUM PO 5/10/2018 at 0730  Yes Yes   Sig: Take 88 mcg by mouth daily   fluticasone (FLONASE) 50 MCG/ACT spray 5/10/2018 at am  Yes Yes   Sig: Spray 1 spray into both nostrils daily   hypromellose (ARTIFICIAL TEARS) 0.4 % SOLN ophthalmic solution Unknown at prn  Yes No   Sig: Place 1 drop into both eyes 4 times daily as needed for dry eyes   polyethylene glycol (MIRALAX/GLYCOLAX) powder 5/10/2018 at am  Yes Yes   Sig: Take 17 g by mouth daily   sennosides (SENOKOT) 8.6 MG tablet Unknown at prn  Yes No   Sig: Take 2 tablets by mouth 2 times daily as needed for constipation   venlafaxine (EFFEXOR-XR) 75 MG 24 hr capsule 5/10/2018 at am  Yes Yes   Sig: Take 75 mg by mouth daily      Facility-Administered Medications: None       Allergies   No Known Allergies    Social History   Social History     Social History     Marital status:      Spouse name: N/A     Number of children: N/A     Years of education: N/A     Occupational History     Not on file.     Social History Main Topics     Smoking status: Never Smoker     Smokeless tobacco: Never Used     Alcohol use No     Drug use: No     Sexual activity: No     Other Topics Concern     Not on file     Social History Narrative     " No narrative on file       Physical Exam     BP (!) 178/105  Temp 99  F (37.2  C) (Oral)  Resp 18  SpO2 96%     Weight: 0 lbs 0 oz There is no height or weight on file to calculate BMI.     # Pain Assessment:[CH1.1]   Tian chase pain level was assessed and he currently denies pain.[CH1.2]        Constitutional: Alert and oriented x[CH1.1]0[CH1.2].[CH1.1]  Unc[CH1.2]ooperative.  Appears stated age.  Appears in[CH1.1] no acute[CH1.2] distress.   HEENT: Oropharynx is clear and moist. No evidence of cranial trauma.  Lymph/Hematologic: No occipital, submental, submandibular, anterior or posterior cervical, or supraclavicular lymphadenopathy is appreciated.  Cardiovascular: Regular rate/rhythm.  S1 and S2 grossly normal.  No appreciable murmur, rub, gallop.[CH1.1]  No[CH1.2] lower extremity edema.  Respiratory: Clear to auscultation bilaterally. Equal chest expansion.  GI: Soft, non-tender, normal bowel sounds, no hepatosplenomegaly.  Genitourinary: Deferred  Musculoskeletal: Normal muscle bulk and tone.  Skin: Warm and dry, no rashes.   Neurologic: Neck supple. Cranial nerves 3-12 are grossly intact.  is symmetric.     Data   Data reviewed today:     Recent Labs  Lab 05/10/18  1913   WBC 13.6*   HGB 14.6   MCV 88         POTASSIUM 4.4   CHLORIDE 114*   CO2 24   BUN 37*   CR 1.50*   ANIONGAP 6   TONYA 8.9   *   ALBUMIN 3.8   PROTTOTAL 7.5   BILITOTAL 0.2   ALKPHOS 66   ALT 34   AST 22   TROPI <0.015       Recent Results (from the past 24 hour(s))   Chest XR,  PA & LAT    Narrative    CHEST TWO VIEWS   5/10/2018 8:49 PM     COMPARISON: None.    HISTORY: Cough.     FINDINGS: The cardiac silhouette, pulmonary vasculature, lungs and  pleural spaces are within normal limits.      Impression    IMPRESSION: Clear lungs.       I personally reviewed[CH1.1] the EKG tracing showing left bundle branch[CH1.2].    Mindi Fox PAWoodrowC  Hospital Medicine[CH1.1]             Revision History        User Key Date/Time  User Provider Type Action    > CH1.2 5/10/2018 10:47 PM Mindi Fox PA-C Physician Assistant Woodrow KOVACS Sign     CH1.1 5/10/2018  9:52 PM Mindi Fox PA-C Physician Assistant Woodrow KOVACS                   Discharge Summaries     No notes of this type exist for this encounter.         Consult Notes      Consults by Penny Vidal RN at 5/11/2018 11:07 AM     Author:  Penny Vidal RN Service:  (none) Author Type:      Filed:  5/11/2018 11:13 AM Date of Service:  5/11/2018 11:07 AM Creation Time:  5/11/2018 11:01 AM    Status:  Signed :  Penny Vidal RN ()         Care Transition Initial Assessment - RN  Reason For Consult: other (see comments) (Nursing Home LTC Resident)   Met with: Spoke with wifeKatey.    DATA   Principal Problem:    Pneumonitis due to inhalation of food or vomitus (H)  Active Problems:    Alzheimer's disease    Bipolar disorder (H)    Depressive disorder    Transient cerebral ischemia    Hypothyroidism, unspecified type    Gastroesophageal reflux disease without esophagitis    Chronic kidney disease, unspecified CKD stage    KARLIE (acute kidney injury) (H)    Aspiration pneumonia (H)       Primary Care Clinic [facility]: [facility]  Primary Care MD Name: Martín Chiu  Contact information and PCP information verified: Yes    ASSESSMENT  Cognitive Status: confused.       Resources List: Skilled Nursing Facility     Lives With: facility resident  Living Arrangements: extended care facility     Description of Support System: Supportive, Involved   Who is your support system?: Wife   Support Assessment: Adequate family and caregiver support   Insurance Concerns: No Insurance issues identified      This writer spoke with wife Katey, introduced self and role.  Discussed discharge planning and Medicare guidelines in regards to home care, TCU and LTC.  The patient lives at [facility] (Phone: [phone] Admissions: [phone] Fax:  [phone]).  Confirmed the patient has a bed hold.  He will return to [facility] upon discharge.  Transportation will be provided by [employer].    PLAN    Return to [facility]      Discharge Planner   Discharge Plans in progress: LT  Barriers to discharge plan: None  Follow up plan: Follow up with PCP       Entered by: Penny Vidal 05/11/2018 11:01 AM           Penny Vidal RN, Care Coordinator [phone][LS1.1]       Revision History        User Key Date/Time User Provider Type Action    > LS1.1 5/11/2018 11:13 AM Penny Vidal RN Case Manager Sign            Consults by Penny Vidal RN at 5/11/2018 10:42 AM     Author:  Penny Vidal RN Service:  (none) Author Type:      Filed:  5/11/2018 10:42 AM Date of Service:  5/11/2018 10:42 AM Creation Time:  5/11/2018 10:41 AM    Status:  Signed :  Penny Vidal RN ()     Consult Orders:    1. Care Transition RN/SW IP Consult [[id number]] ordered by Mindi Fox PA-C at 05/10/18 3645                Care Transitions:  Patient has  [place] to Observation  order. Patient has been given Patient Bill of Rights, Observation brochure and  What does Observation mean to me  forms.  Patient has been given the opportunity to ask questions about observation status and their plan of care.   Penny Vidal RN, Care Coordinator [phone][LS1.1]     Revision History        User Key Date/Time User Provider Type Action    > LS1.1 5/11/2018 10:42 AM Penny Vidal RN Case Manager Sign                     Progress Notes - Physician (Notes from 05/08/18 through 05/11/18)      Progress Notes by Alexandro Banks MD at 5/11/2018  7:24 AM     Author:  Alexandro Banks MD Service:  Hospitalist Author Type:  Physician    Filed:  5/11/2018 11:17 AM Date of Service:  5/11/2018  7:24 AM Creation Time:  5/11/2018  7:24 AM    Status:  Signed :  Alexandro Banks MD (Physician)         [facility] Progress Note            Assessment and Plan:[KK1.1]     Tian Espitia is a 86 year old male with PMH significant for Alzheimer's disease, Bipolar, CKD stage III, depression, GERD, hypothyroidism , hx of TIA who now presents with cough and hx of aspiration pneumonia.        Pneumonitis due to inhalation of food or vomitus (H)    5/10/18 -- Presents with cough after questionable choking episode and nausea.  VS reveal mild tachycardia.  KARLIE as below.  WBC 13.5.  EKG with left bundle branch block.  Unclear if this is new; no cardiac complaints.  CXR without infiltrate.  Given 500cc NS in ED.  Started on Unasyn.  5/11/2018 --UA negative.[KK1.2]  Chest x-ray remains negative overall looks like an aspiration pneumonitis with no clear pneumonia present.  Patient appears near baseline.  Will discharge with 5 more days of augmentin and plan for dysphagia diet level 1/nectar as below.     [KK1.3]  Dysphagia   5/11/2018 -- per speech evaluation today recommended pureed solids and nectar consistency liquids.   (Dysphagia diet level 1/nectar) - will plan for this on discharge.[KK1.4]      KARLIE (acute kidney injury) (H) with underlying Chronic kidney disease, unspecified CKD stage  5/10/18 -- Creatinine 1.0 - 1.3. 1.5 on admission, continue IVF  5/11/2018 -- back to baseline.       Alzheimer's disease  Bipolar disorder (H)  Depressive disorder  5/10/18 -- Severe dementia at baseline.  - continue PTA Effexor  5/11/2018 --[KK1.2] appears baseline.[KK1.3]         Transient cerebral ischemia  - continue PTA aspirin      Hypothyroidism, unspecified type  - continue PTA levothyroxine      Gastroesophageal reflux disease without esophagitis  Not on medication    Code Status: DNR / DNI   [KK1.2]  Disposition[KK1.1]  Discharge back to LTC.[KK1.3]              Interval History:[KK1.1]   Improving.  Doing well on room air.  Patient answering questions about current symptoms consistently and appropriately although occasionally needing redirection.  Not able to answer  "any historical questions, and only oriented x 1 although when told he's in the hospital says \"that sounds right\".  No fever or chills.  No pain.  No dyspnea.  On room air.  Mental status appears baseline.[KK1.4]              Review of Systems:[KK1.1]    ROS: 10 point ROS neg other than the symptoms noted above in the HPI.[KK1.4]             Medications:   Current active medications and PTA medications reviewed, see medication list for details.            Physical Exam:   Vitals were reviewed  Patient Vitals for the past 24 hrs:   BP Temp Temp src Heart Rate Resp SpO2 Height Weight   18 0346 151/77 98.5  F (36.9  C) Oral 71 18 97 % - -   05/10/18 2300 - - - - - - 1.727 m (5' 8\") 63.5 kg (139 lb 15.9 oz)   05/10/18 2230 (!) 158/91 99.4  F (37.4  C) Oral 103 20 94 % - -   05/10/18 2200 (!) 148/96 - - 92 24 94 % - -   05/10/18 2130 (!) 150/102 - - 106 14 94 % - -   05/10/18 2115 (!) 178/105 - - - - - - -   05/10/18 2030 - - - 93 - 96 % - -   05/10/18 2015 (!) 177/106 - - 99 - - - -   05/10/18 2000 (!) 157/126 - - 108 - 95 % - -   05/10/18 1945 (!) 170/108 - - 86 - - - -   05/10/18 1930 (!) 175/104 - - 80 - 93 % - -   05/10/18 1915 - - - 86 - 94 % - -   05/10/18 1910 (!) 187/106 99  F (37.2  C) Oral 99 18 94 % - -       Temperatures:  Current - Temp: 98.5  F (36.9  C); Max - Temp  Av  F (37.2  C)  Min: 98.5  F (36.9  C)  Max: 99.4  F (37.4  C)  Respiration range: Resp  Av.8  Min: 14  Max: 24  Pulse range: No Data Recorded  Blood pressure range: Systolic (24hrs), Av , Min:148 , Max:187   ; Diastolic (24hrs), Av, Min:77, Max:126    Pulse oximetry range: SpO2  Av.6 %  Min: 93 %  Max: 97 %  I/O last 3 completed shifts:  In: 630 [P.O.:30; I.V.:600]  Out: 200 [Urine:200]    Intake/Output Summary (Last 24 hours) at 18 0724  Last data filed at 18 0527   Gross per 24 hour   Intake              630 ml   Output              200 ml   Net              430 ml[KK1.1]     EXAM:  General: awake " and alert, NAD, oriented x 1  Head: normocephalic  Neck: unremarkable, no lymphadenopathy   HEENT: oropharynx pink and moist    Heart: Regular rate and rhythm, no murmurs, rubs, or gallops  Lungs: clear to auscultation bilaterally with good air movement throughout  Abdomen: soft, non-tender, no masses or organomegaly  Extremities: no edema in lower extremities   Skin unremarkable.[KK1.4]               Data:[KK1.1]     Results for orders placed or performed during the hospital encounter of 05/10/18 (from the past 24 hour(s))   CBC with platelets differential   Result Value Ref Range    WBC 13.6 (H) 4.0 - 11.0 10e9/L    RBC Count 4.97 4.4 - 5.9 10e12/L    Hemoglobin 14.6 13.3 - 17.7 g/dL    Hematocrit 43.7 40.0 - 53.0 %    MCV 88 78 - 100 fl    MCH 29.4 26.5 - 33.0 pg    MCHC 33.4 31.5 - 36.5 g/dL    RDW 13.6 10.0 - 15.0 %    Platelet Count 204 150 - 450 10e9/L    Diff Method Automated Method     % Neutrophils 87.3 %    % Lymphocytes 5.8 %    % Monocytes 6.2 %    % Eosinophils 0.4 %    % Basophils 0.2 %    % Immature Granulocytes 0.1 %    Absolute Neutrophil 11.8 (H) 1.6 - 8.3 10e9/L    Absolute Lymphocytes 0.8 0.8 - 5.3 10e9/L    Absolute Monocytes 0.8 0.0 - 1.3 10e9/L    Absolute Eosinophils 0.1 0.0 - 0.7 10e9/L    Absolute Basophils 0.0 0.0 - 0.2 10e9/L    Abs Immature Granulocytes 0.0 0 - 0.4 10e9/L   Comprehensive metabolic panel   Result Value Ref Range    Sodium 144 133 - 144 mmol/L    Potassium 4.4 3.4 - 5.3 mmol/L    Chloride 114 (H) 94 - 109 mmol/L    Carbon Dioxide 24 20 - 32 mmol/L    Anion Gap 6 3 - 14 mmol/L    Glucose 126 (H) 70 - 99 mg/dL    Urea Nitrogen 37 (H) 7 - 30 mg/dL    Creatinine 1.50 (H) 0.66 - 1.25 mg/dL    GFR Estimate 44 (L) >60 mL/min/1.7m2    GFR Estimate If Black 54 (L) >60 mL/min/1.7m2    Calcium 8.9 8.5 - 10.1 mg/dL    Bilirubin Total 0.2 0.2 - 1.3 mg/dL    Albumin 3.8 3.4 - 5.0 g/dL    Protein Total 7.5 6.8 - 8.8 g/dL    Alkaline Phosphatase 66 40 - 150 U/L    ALT 34 0 - 70 U/L     AST 22 0 - 45 U/L   Troponin I   Result Value Ref Range    Troponin I ES <0.015 0.000 - 0.045 ug/L   Lactic acid whole blood   Result Value Ref Range    Lactic Acid 1.4 0.7 - 2.0 mmol/L   Bilirubin direct   Result Value Ref Range    Bilirubin Direct <0.1 0.0 - 0.2 mg/dL   Chest XR,  PA & LAT    Narrative    CHEST TWO VIEWS   5/10/2018 8:49 PM     COMPARISON: None.    HISTORY: Cough.     FINDINGS: The cardiac silhouette, pulmonary vasculature, lungs and  pleural spaces are within normal limits.      Impression    IMPRESSION: Clear lungs.    DAISHA BEASLEY MD   UA with Microscopic reflex to Culture   Result Value Ref Range    Color Urine Yellow     Appearance Urine Clear     Glucose Urine 50 (A) NEG^Negative mg/dL    Bilirubin Urine Negative NEG^Negative    Ketones Urine Negative NEG^Negative mg/dL    Specific Gravity Urine 1.016 1.003 - 1.035    Blood Urine Negative NEG^Negative    pH Urine 6.0 5.0 - 7.0 pH    Protein Albumin Urine Negative NEG^Negative mg/dL    Urobilinogen mg/dL 0.0 0.0 - 2.0 mg/dL    Nitrite Urine Negative NEG^Negative    Leukocyte Esterase Urine Negative NEG^Negative    Source Midstream Urine     WBC Urine <1 0 - 5 /HPF    RBC Urine 1 0 - 2 /HPF   Urine Culture Aerobic Bacterial   Result Value Ref Range    Specimen Description Midstream Urine     Special Requests Specimen received in preservative     Culture Micro PENDING    Care Transition RN/SW IP Consult    Penny Page RN     5/11/2018 10:42 AM  Care Transitions:  Patient has  [place] to Observation  order. Patient has been   given Patient Bill of Rights, Observation brochure and  What does   Observation mean to me  forms.  Patient has been given the   opportunity to ask questions about observation status and their   plan of care.   Penny Vidal RN, Care Coordinator [phone]   Basic metabolic panel   Result Value Ref Range    Sodium 145 (H) 133 - 144 mmol/L    Potassium 4.1 3.4 - 5.3 mmol/L    Chloride 116 (H) 94 - 109  mmol/L    Carbon Dioxide 23 20 - 32 mmol/L    Anion Gap 6 3 - 14 mmol/L    Glucose 114 (H) 70 - 99 mg/dL    Urea Nitrogen 30 7 - 30 mg/dL    Creatinine 1.20 0.66 - 1.25 mg/dL    GFR Estimate 57 (L) >60 mL/min/1.7m2    GFR Estimate If Black 69 >60 mL/min/1.7m2    Calcium 8.4 (L) 8.5 - 10.1 mg/dL   CBC with platelets differential   Result Value Ref Range    WBC 13.8 (H) 4.0 - 11.0 10e9/L    RBC Count 4.33 (L) 4.4 - 5.9 10e12/L    Hemoglobin 12.7 (L) 13.3 - 17.7 g/dL    Hematocrit 38.4 (L) 40.0 - 53.0 %    MCV 89 78 - 100 fl    MCH 29.3 26.5 - 33.0 pg    MCHC 33.1 31.5 - 36.5 g/dL    RDW 13.2 10.0 - 15.0 %    Platelet Count 173 150 - 450 10e9/L    Diff Method Automated Method     % Neutrophils 89.4 %    % Lymphocytes 3.8 %    % Monocytes 6.5 %    % Eosinophils 0.1 %    % Basophils 0.1 %    % Immature Granulocytes 0.1 %    Absolute Neutrophil 12.3 (H) 1.6 - 8.3 10e9/L    Absolute Lymphocytes 0.5 (L) 0.8 - 5.3 10e9/L    Absolute Monocytes 0.9 0.0 - 1.3 10e9/L    Absolute Eosinophils 0.0 0.0 - 0.7 10e9/L    Absolute Basophils 0.0 0.0 - 0.2 10e9/L    Abs Immature Granulocytes 0.0 0 - 0.4 10e9/L[KK1.5]           Attestation:[KK1.1]  I have reviewed today's vital signs, notes, medications, labs and imaging.  Amount of time performed on this discharge summary: 40 minutes.[KK1.4]     Alexandro Banks MD, MD[KK1.1]                          Revision History        User Key Date/Time User Provider Type Action    > KK1.3 5/11/2018 11:17 AM Alexandro Banks MD Physician Sign     KK1.5 5/11/2018 11:07 AM Alexandro Banks MD Physician      KK1.4 5/11/2018 11:04 AM Alexandro Banks MD Physician      KK1.2 5/11/2018  7:25 AM Alexandro Banks MD Physician      KK1.1 5/11/2018  7:24 AM Alexandro Banks MD Physician             Progress Notes by Ilene Jarrell SLP at 5/11/2018 10:48 AM     Author:  Ilene Jarrell SLP Service:  (none) Author Type:  Speech Language    Filed:  5/11/2018 10:50 AM Date of Service:  5/11/2018 10:48 AM Creation Time:   5/11/2018 10:48 AM    Status:  Signed :  Ilene Jarrell SLP (Speech Language)         Impression:  P[PK1.1]t presents with moderate oral phase dysphagia characterized by decreased coordination of bolus and prolonged mastication and preparation for swallow.  Pt masticated small piece of trent cracker for over a minute before swallowing.  Mild-moderate pharyngeal dysphagia with aspiration symptoms with thin liquid with cough.  Pt unable to follow directions for swallow strategies to increase safety due to cognitive status.  Diet recommendation: pureed solids[PK1.2] and[PK1.1] nectar[PK1.2] consistency[PK1.1] liquids.   (Dysphagia diet level 1/nectar)[PK1.2]       Bedside Swallow Evaluation[PK1.1]  05/11/18    General Information   Onset Date 05/10/18   Start of Care Date 05/11/18   Referring Physician SHAI Chappell   Patient Profile Review/OT: Additional Occupational Profile Info See Profile for full history and prior level of function   Patient/Family Goals Statement pt unable to state due to cognitive status   Swallowing Evaluation Bedside swallow evaluation   Behaviorial Observations Alert   Mode of current nutrition NPO  (until swallow evaluation)   Respiratory Status Room air   Comments Pt brought to ER due to coughing episode where he lives in Memory Care Unit.  Admitted under observation status for possible aspiration episode.   Clinical Swallow Evaluation   Oral Musculature unable to assess due to poor participation/comprehension   Structural Abnormalities none present   Dentition edentulous, does not have dentures   Mucosal Quality adequate   Mandibular Strength and Mobility impaired   Oral Labial Strength and Mobility WFL   Lingual Strength and Mobility impaired anterior elevation   Additional Documentation Yes   Clinical Swallow Eval: Thin Liquid Texture Trial   Mode of Presentation, Thin Liquids cup;self-fed   Volume of Liquid or Food Presented sip of water   Oral Phase of Swallow WFL;Premature  pharyngeal entry   Pharyngeal Phase of Swallow impaired;coughing/choking   Diagnostic Statement Delayed cough after swallow.     Clinical Swallow Eval: Nectar Thick Liquid Texture Trial   Mode of Presentation, Nectar cup;self-fed   Volume of Nectar Presented 2 sips   Oral Phase, Nectar WFL   Pharyngeal Phase, [place] intact   Diagnostic Statement Mild oral delay but WFL   Clinical Swallow Eval: Pudding Thick Liquid Texture Trial   Mode of Presentation, Pudding cup;self-fed   Volume of Pudding Presented 2 bites   Oral Phase, Pudding WFL   Pharyngeal Phase, Pudding intact   Diagnostic Statement Mild- moderate delay with oral preparation.  WFL.  No overt penetration or aspiration symptoms.   Clinical Swallow Eval: Solid Food Texture Trial   Mode of Presentation, Solid self-fed   Volume of Solid Food Presented less than 1/8 trent cracker   Oral Phase, Solid (prolonged oral phase)   Pharyngeal Phase, Solid intact   Diagnostic Statement Poor oral prepartation phase with prolonged mastication of small bite of cracker for over a minute.     Swallow Eval: Clinical Impressions   Skilled Criteria for Therapy Intervention No significant expected  improvement in functional status   Functional Assessment Scale (FAS) 4   Treatment Diagnosis Moderate oral, mild-moderate pharyngeal dysphagia   Diet texture recommendations Dysphagia diet level 1;Nectar thick liquids   Anticipated Discharge Disposition extended care facility  (Memory care unit)   Clinical Impression Comments Pt presents with moderate oral phase dysphagia characterized by decreased coordination of bolus and prolonged mastication and preparation for swallow.  Pt masticated small piece of trent cracker for over a minute before swallowing.  Mild-moderate pharyngeal dysphagia with aspiration symptoms with thin liquid with cough.  Pt unable to follow directions for swallow strategies to increase safety due to cognitive status.  Diet recommendation: pureed solids/nectar  liquids.   (Dysphagia diet level 1/nectar)   1x Eval Only-Outpatient/Observation Medicare G-Code   G-code Swallowing   Swallowing   Swallowing:  Current Status , Goal , Discharge -Ywxx Only-Modifier the same for all G-codes CK: 40-59% impairment   Swallowing: Current  & Discharge Modifier Rationale-Eval Only outcome measure tool level 4   Total Evaluation Time   Total Evaluation Time (Minutes) 30[PK1.2]        Revision History        User Key Date/Time User Provider Type Action    > PK1.2 5/11/2018 10:50 AM Ilene Jarrell SLP Speech Language Sign     PK1.1 5/11/2018 10:48 AM Ilene Jarrell SLP Speech Language             Progress Notes by Ilene Jarrell SLP at 5/11/2018 10:18 AM     Author:  Ilene Jarrell SLP Service:  (none) Author Type:  Speech Language    Filed:  5/11/2018 10:19 AM Date of Service:  5/11/2018 10:18 AM Creation Time:  5/11/2018 10:18 AM    Status:  Signed :  Ilene Jarrell SLP (Speech Language)         Bedside swallow evaluation completed.  Full note to follow.  Diet recommendation is dysphagia diet level 1 (pureed) with liquids thickened to nectar consistency.[PK1.1]     Revision History        User Key Date/Time User Provider Type Action    > PK1.1 5/11/2018 10:19 AM Ilene Jarrell SLP Speech Language Sign            Progress Notes by Lora Beckett RN at 5/11/2018  1:18 AM     Author:  Kurilla, Lora, RN Service:  Nursing Author Type:  Registered Nurse    Filed:  5/11/2018  1:19 AM Date of Service:  5/11/2018  1:18 AM Creation Time:  5/11/2018  1:18 AM    Status:  Signed :  Lora Beckett RN (Registered Nurse)         Skin affirmation note    Admitting nurse completed full skin assessment, Bijan score and Bijan interventions. This writer agrees with the initial skin assessment findings.[AK1.1]     Revision History        User Key Date/Time User Provider Type Action    > AK1.1 5/11/2018  1:19 AM Lora Beckett, RN Registered Nurse Sign            ED Notes by Carrillo  JUAN Jay at 5/10/2018  7:02 PM     Author:  Misty Vizcaino RN Service:  (none) Author Type:  Registered Nurse    Filed:  5/10/2018  7:02 PM Date of Service:  5/10/2018  7:02 PM Creation Time:  5/10/2018  7:02 PM    Status:  Signed :  Misty Vizcaino RN (Registered Nurse)         Bed: ED18  Expected date:   Expected time:   Means of arrival:   Comments:     Revision History        User Key Date/Time User Provider Type Action    > KH1.1 5/10/2018  7:02 PM Misty Vizcaino RN Registered Nurse Sign                  Procedure Notes     No notes of this type exist for this encounter.         Progress Notes - Therapies (Notes from 05/08/18 through 05/11/18)      Progress Notes by Ilene Jarrell SLP at 5/11/2018 10:48 AM     Author:  Ilene Jarrell SLP Service:  (none) Author Type:  Speech Language    Filed:  5/11/2018 10:50 AM Date of Service:  5/11/2018 10:48 AM Creation Time:  5/11/2018 10:48 AM    Status:  Signed :  Ilene Jarrell SLP (Speech Language)         Impression:  P[PK1.1]t presents with moderate oral phase dysphagia characterized by decreased coordination of bolus and prolonged mastication and preparation for swallow.  Pt masticated small piece of trent cracker for over a minute before swallowing.  Mild-moderate pharyngeal dysphagia with aspiration symptoms with thin liquid with cough.  Pt unable to follow directions for swallow strategies to increase safety due to cognitive status.  Diet recommendation: pureed solids[PK1.2] and[PK1.1] nectar[PK1.2] consistency[PK1.1] liquids.   (Dysphagia diet level 1/nectar)[PK1.2]       Bedside Swallow Evaluation[PK1.1]  05/11/18    General Information   Onset Date 05/10/18   Start of Care Date 05/11/18   Referring Physician SHAI [place]   Patient Profile Review/OT: Additional Occupational Profile Info See Profile for full history and prior level of function   Patient/Family Goals Statement pt unable to state due to cognitive status   Swallowing Evaluation Bedside  swallow evaluation   Behaviorial Observations Alert   Mode of current nutrition NPO  (until swallow evaluation)   Respiratory Status Room air   Comments Pt brought to ER due to coughing episode where he lives in Memory Care Unit.  Admitted under observation status for possible aspiration episode.   Clinical Swallow Evaluation   Oral Musculature unable to assess due to poor participation/comprehension   Structural Abnormalities none present   Dentition edentulous, does not have dentures   Mucosal Quality adequate   Mandibular Strength and Mobility impaired   Oral Labial Strength and Mobility WFL   Lingual Strength and Mobility impaired anterior elevation   Additional Documentation Yes   Clinical Swallow Eval: Thin Liquid Texture Trial   Mode of Presentation, Thin Liquids cup;self-fed   Volume of Liquid or Food Presented sip of water   Oral Phase of Swallow WFL;Premature pharyngeal entry   Pharyngeal Phase of Swallow impaired;coughing/choking   Diagnostic Statement Delayed cough after swallow.     Clinical Swallow Eval: Nectar Thick Liquid Texture Trial   Mode of Presentation, Nectar cup;self-fed   Volume of Nectar Presented 2 sips   Oral Phase, Nectar WFL   Pharyngeal Phase, [place] intact   Diagnostic Statement Mild oral delay but WFL   Clinical Swallow Eval: Pudding Thick Liquid Texture Trial   Mode of Presentation, Pudding cup;self-fed   Volume of Pudding Presented 2 bites   Oral Phase, Pudding WFL   Pharyngeal Phase, Pudding intact   Diagnostic Statement Mild- moderate delay with oral preparation.  WFL.  No overt penetration or aspiration symptoms.   Clinical Swallow Eval: Solid Food Texture Trial   Mode of Presentation, Solid self-fed   Volume of Solid Food Presented less than 1/8 trent cracker   Oral Phase, Solid (prolonged oral phase)   Pharyngeal Phase, Solid intact   Diagnostic Statement Poor oral prepartation phase with prolonged mastication of small bite of cracker for over a minute.     Swallow Eval:  Clinical Impressions   Skilled Criteria for Therapy Intervention No significant expected  improvement in functional status   Functional Assessment Scale (FAS) 4   Treatment Diagnosis Moderate oral, mild-moderate pharyngeal dysphagia   Diet texture recommendations Dysphagia diet level 1;Nectar thick liquids   Anticipated Discharge Disposition extended care facility  (Memory care unit)   Clinical Impression Comments Pt presents with moderate oral phase dysphagia characterized by decreased coordination of bolus and prolonged mastication and preparation for swallow.  Pt masticated small piece of trent cracker for over a minute before swallowing.  Mild-moderate pharyngeal dysphagia with aspiration symptoms with thin liquid with cough.  Pt unable to follow directions for swallow strategies to increase safety due to cognitive status.  Diet recommendation: pureed solids/nectar liquids.   (Dysphagia diet level 1/nectar)   1x Eval Only-Outpatient/Observation Medicare G-Code   G-code Swallowing   Swallowing   Swallowing:  Current Status , Goal , Discharge -Xcud Only-Modifier the same for all G-codes CK: 40-59% impairment   Swallowing: Current  & Discharge Modifier Rationale-Eval Only outcome measure tool level 4   Total Evaluation Time   Total Evaluation Time (Minutes) 30[PK1.2]        Revision History        User Key Date/Time User Provider Type Action    > PK1.2 5/11/2018 10:50 AM Ilene Jarrell SLP Speech Language Sign     PK1.1 5/11/2018 10:48 AM Ilene Jarrell SLP Speech Language             Progress Notes by Ilene Jarrell SLP at 5/11/2018 10:18 AM     Author:  Ilene Jarrell SLP Service:  (none) Author Type:  Speech Language    Filed:  5/11/2018 10:19 AM Date of Service:  5/11/2018 10:18 AM Creation Time:  5/11/2018 10:18 AM    Status:  Signed :  Ilene Jarrell SLP (Speech Language)         Bedside swallow evaluation completed.  Full note to follow.  Diet recommendation is dysphagia diet level 1 (pureed)  with liquids thickened to nectar consistency.[PK1.1]     Revision History        User Key Date/Time User Provider Type Action    > PK1.1 5/11/2018 10:19 AM Ilene Jarrell, SLP Speech Language Sign

## 2018-05-10 NOTE — LETTER
Transition Communication Hand-off for Care Transitions to Next Level of Care Provider    Name: Tian Espitia  : 1932  MRN #: [id number]  Primary Care Provider: Martín Chiu  Primary Care MD Name: Martín Chiu  Primary Clinic: [address]   [facility] [address]  Primary Care Clinic Name: [facility]  Reason for Hospitalization:  Aspiration pneumonia due to inhalation of vomitus (H) [J69.0]  Admit Date/Time: 5/10/2018  7:02 PM  Discharge Date: 18  Payor Source: Payor: BLUE PLUS / Plan: BLUE PLUS SECURE BLUE / Product Type: HMO /         Reason for Communication Hand-off Referral: Admission diagnoses: PN (aspiration).    Discharge Plan:  Return to [facility] (Phone: [phone] Admissions: [phone] Fax: [phone]).       Concern for non-adherence with plan of care:   Y/N No  Discharge Needs Assessment:  Needs       Most Recent Value    Skilled Nursing [facility] [phone], Fax: [phone]        Follow-up plan:  No future appointments.          Key Recommendations:  The patient lives at [facility] (Phone: [phone] Admissions: [phone] Fax: [phone]).  Confirmed the patient has a bed hold.  He will return to [facility] upon discharge.  Transportation will be provided by [email or web address]. Wife, Katey in very involved and supportive.          Penny Vidal RN, Care Coordinator    AVS/Discharge Summary is the source of truth; this is a helpful guide for improved communication of patient story

## 2018-05-10 NOTE — IP AVS SNAPSHOT
` `     [facility] SURGICAL: [phone]            Medication Administration Report for Tian Espitia as of 05/11/18 1138   Legend:    Given Hold Not Given Due Canceled Entry Other Actions    Time Time (Time) Time  Time-Action       Inactive    Active    Linked        Medications 05/05/18 05/06/18 05/07/18 05/08/18 05/09/18 05/10/18 05/11/18    acetaminophen (TYLENOL) tablet 650 mg  Dose: 650 mg  Freq: EVERY 4 HOURS PRN Route: PO  PRN Reason: mild pain  Start: 05/10/18 2324   Admin Instructions: Alternate ibuprofen (if ordered) with acetaminophen.  Maximum acetaminophen dose from all sources = 75 mg/kg/day not to exceed 4 grams/day.    Admin. Amount: 2 tablet (2 × 325 mg tablet)  Last Admin: 05/10/18 2341  Dispense Loc: [facility]          2341 (650 mg)-Given            ampicillin-sulbactam (UNASYN) 3 g vial to attach to  mL bag  Dose: 3 g  Freq: EVERY 6 HOURS Route: IV  Indications of Use: ASPIRATION PNEUMONIA  Start: 05/11/18 0400   Admin. Amount: 3 g  Last Admin: 05/11/18 1127  [facility]  Infused Over: 15-30 Minutes   Current Line: Peripheral IV 05/10/18 Left Lower forearm          0453 (3 g)-New Bag       1127 (3 g)-New Bag       [ ] 1600       [ ] 2200           aspirin chewable tablet 81 mg  Dose: 81 mg  Freq: AT BEDTIME Route: PO  Start: 05/10/18 2330   Admin. Amount: 1 tablet (1 × 81 mg tablet)  Last Admin: 05/10/18 2341  Dispense Loc: [facility]          2341 (81 mg)-Given        [ ] 2200           levothyroxine (SYNTHROID/LEVOTHROID) tablet 88 mcg  Dose: 88 mcg  Freq: EVERY MORNING BEFORE BREAKFAST Route: PO  Start: 05/11/18 0630   Admin. Amount: 1 tablet (1 × 88 mcg tablet)  Last Admin: 05/11/18 0830  Dispense Loc: [facility]           0830 (88 mcg)-Given           melatonin tablet 1 mg  Dose: 1 mg  Freq: AT BEDTIME PRN Route: PO  PRN Reason: sleep  Start: 05/10/18 2324   Admin Instructions: Do not give unless at least 6 hours of uninterrupted sleep  is expected.    Admin. Amount: 1 tablet (1 × 1 mg tablet)  Dispense Loc: [facility] 400               naloxone (NARCAN) injection 0.1-0.4 mg  Dose: 0.1-0.4 mg  Freq: EVERY 2 MIN PRN Route: IV  PRN Reason: opioid reversal  Start: 05/10/18 2324   Admin Instructions: For respiratory rate LESS than or EQUAL to 8.  Partial reversal dose:  0.1 mg titrated q 2 minutes for Analgesia Side Effects Monitoring Sedation Level of 3 (frequently drowsy, arousable, drifts to sleep during conversation).Full reversal dose:  0.4 mg bolus for Analgesia Side Effects Monitoring Sedation Level of 4 (somnolent, minimal or no response to stimulation).  For ordered doses up to 2mg give IVP. Give each 0.4mg over 15 seconds in emergency situations. For non-emergent situations further dilute in 9mL of NS to facilitate titration of response.    Admin. Amount: 0.1-0.4 mg = 0.25-1 mL Conc: 0.4 mg/mL  Dispense Loc: [facility] 200  Volume: 1 mL               ondansetron (ZOFRAN-ODT) ODT tab 4 mg  Dose: 4 mg  Freq: EVERY 6 HOURS PRN Route: PO  PRN Reasons: nausea,vomiting  Start: 05/10/18 2324   Admin Instructions: This is Step 1 of nausea and vomiting management.  If nausea not resolved in 15 minutes, go to Step 2 prochlorperazine (COMPAZINE). Do not push through foil backing. Peel back foil and gently remove. Place on tongue immediately. Administration with liquid unnecessary  With dry hands, peel back foil backing and gently remove tablet; do not push oral disintegrating tablet through foil backing; administer immediately on tongue and oral disintegrating tablet dissolves in seconds; then swallow with saliva; liquid not required.    Admin. Amount: 1 tablet (1 × 4 mg tablet)  Dispense Loc: [facility] 200              Or  ondansetron (ZOFRAN) injection 4 mg  Dose: 4 mg  Freq: EVERY 6 HOURS PRN Route: IV  PRN Reasons: nausea,vomiting  Start: 05/10/18 2324   Admin Instructions: This is Step 1 of nausea and vomiting management.  If nausea not  resolved in 15 minutes, go to Step 2 prochlorperazine (COMPAZINE).  Irritant. For ordered doses up to 4 mg, give IV Push undiluted over 2-5 minutes.    Admin. Amount: 4 mg = 2 mL Conc: 4 mg/2 mL  Dispense Loc: [facility] 200  Infused Over: 2-5 Minutes  Volume: 2 mL               prochlorperazine (COMPAZINE) injection 5 mg  Dose: 5 mg  Freq: EVERY 6 HOURS PRN Route: IV  PRN Reasons: nausea,vomiting  Start: 05/10/18 2324   Admin Instructions: This is Step 2 of nausea and vomiting management. Give if nausea not resolved 15 minutes after giving ondansetron (ZOFRAN). If nausea not resolved in 15 minutes, go to Step 3 metoclopramide (REGLAN), if ordered.  For ordered doses up to 10 mg, give IV Push undiluted. Each 5mg over 1 minute.    Admin. Amount: 5 mg = 1 mL Conc: 5 mg/mL  Dispense Loc: [facility] 200  Infused Over: 1-2 Minutes  Volume: 1 mL              Or  prochlorperazine (COMPAZINE) tablet 5 mg  Dose: 5 mg  Freq: EVERY 6 HOURS PRN Route: PO  PRN Reason: vomiting  Start: 05/10/18 2324   Admin Instructions: This is Step 2 of nausea and vomiting management. Give if nausea not resolved 15 minutes after giving ondansetron (ZOFRAN). If nausea not resolved in 15 minutes, go to Step 3 metoclopramide (REGLAN), if ordered.    Admin. Amount: 1 tablet (1 × 5 mg tablet)  Dispense Loc: [facility] 200              Or  prochlorperazine (COMPAZINE) Suppository 12.5 mg  Dose: 12.5 mg  Freq: EVERY 12 HOURS PRN Route: RE  PRN Reasons: nausea,vomiting  Start: 05/10/18 2324   Admin Instructions: This is Step 2 of nausea and vomiting management. Give if nausea not resolved 15 minutes after giving ondansetron (ZOFRAN). If nausea not resolved in 15 minutes, go to Step 3 metoclopramide (REGLAN), if ordered.    Admin. Amount: 0.5 suppository (0.5 × 25 mg suppository)  Dispense Loc: [facility] 200               sodium chloride 0.45% infusion  Rate: 75 mL/hr   Freq: CONTINUOUS Route: IV  Start: 05/11/18 0730   Last Admin: 05/11/18  0837  Dispense Loc: [facility] Floor Stock  Volume: 1,000 mL   Current Line: Peripheral IV 05/10/18 Left Lower forearm          0837 ( )-New Bag           venlafaxine (EFFEXOR-ER) 24 hr tablet 75 mg  Dose: 75 mg  Freq: DAILY WITH BREAKFAST Route: PO  Start: 18 0800   Admin Instructions: DO NOT CRUSH.    Admin. Amount: 1 tablet (1 × 75 mg tablet)  Last Admin: 18 0830  Dispense Loc: [facility] 200           0830 (75 mg)-Given          Completed Medications  Medications 05/05/18 05/06/18 05/07/18 05/08/18 05/09/18 05/10/18 05/11/18         Dose: 500 mL  Freq: ONCE Route: IV  Last Dose: 500 mL (05/10/18 2142)  Start: 05/10/18 2124   End: 05/10/18 2242   Admin. Amount: 500 mL  Last Admin: 05/10/18 2142  Dispense Loc: [facility] ER  Infused Over: 1 Hours  Administrations Remainin  Volume: 1,000 mL           (500 mL)-New Bag              Dose: 3 g  Freq: ONCE Route: IV  Indications of Use: ASPIRATION PNEUMONIA  Last Dose: Stopped (05/10/18 2251)  Start: 05/10/18 2125   End: 05/10/18 2251   Admin. Amount: 3 g  Last Admin: 05/10/18 2212  Dispense Loc: [facility]  Infused Over: 15-30 Minutes  Administrations Remainin           (3 g)-New Bag       -Stopped           Discontinued Medications  Medications 05/05/18 05/06/18 05/07/18 05/08/18 05/09/18 05/10/18 05/11/18         Rate: 75 mL/hr   Freq: CONTINUOUS Route: IV  Last Dose: Stopped (18 0835)  Start: 05/10/18 2330   End: 18   Last Admin: 18  Dispense Loc: [facility] Floor Stock  Volume: 1,000 mL          2353 ( )-Rate/Dose Verify        0455 ( )-New Bag       0724-Med Discontinued  0835-Stopped             Rate: 125 mL/hr Dose: 1000 mL  Freq: CONTINUOUS Route: IV  Start: 05/10/18 2124   End: 05/10/18 2324   Admin Instructions: Administer after the bolus/s    Admin. Amount: 1,000 mL  Dispense Loc: [facility] Stock  Volume: 1,000 mL          [facility]-Med Discontinued                Dose: 75 mg  Freq: DAILY Route: PO  Start:  05/11/18 0800   End: 05/10/18 2329   Admin Instructions: DO NOT CRUSH.    Admin. Amount: 1 capsule (1 × 75 mg capsule)  Dispense Loc: [facility]          [address] [place] [address]

## 2018-05-10 NOTE — IP AVS SNAPSHOT
"          [facility]: [phone]                                              INTERAGENCY TRANSFER FORM - LAB / IMAGING / EKG / EMG RESULTS   5/10/2018                    Hospital Admission Date: 5/10/2018  MARIO LUONG   : 1932  Sex: Male        Attending Provider: Alexandro Banks MD     Allergies:  No Known Allergies    Infection:  None   Service:  GENERAL MEDI    Ht:  1.727 m (5' 8\")   Wt:  63.5 kg (139 lb 15.9 oz)   Admission Wt:  63.5 kg (139 lb 15.9 oz)    BMI:  21.29 kg/m 2   BSA:  1.75 m 2            Patient PCP Information     Provider PCP Type    CELSA Crowell CNP General         Lab Results - 3 Days      Basic metabolic panel [[id number]] (Abnormal)  Resulted: 18, Result status: Final result    Ordering provider: Mindi Fox PA-C  18 0001 Resulting lab: [facility]    Specimen Information    Type Source Collected On   Blood  18 0558          Components       Value Reference Range Flag Lab   Sodium 145 133 - 144 mmol/L H 59   Potassium 4.1 3.4 - 5.3 mmol/L  59   Chloride 116 94 - 109 mmol/L H 59   Carbon Dioxide 23 20 - 32 mmol/L  59   Anion Gap 6 3 - 14 mmol/L  59   Glucose 114 70 - 99 mg/dL H 59   Urea Nitrogen 30 7 - 30 mg/dL  59   Creatinine 1.20 0.66 - 1.25 mg/dL  59   GFR Estimate 57 >60 mL/min/1.7m2 L 59   Comment:  Non  GFR Calc   GFR Estimate If Black 69 >60 mL/min/1.7m2  59   Comment:  African American GFR Calc   Calcium 8.4 8.5 - 10.1 mg/dL L 59            CBC with platelets differential [[id number]] (Abnormal)  Resulted: 18, Result status: Final result    Ordering provider: Mindi Fox PA-C  18 0001 Resulting lab: [facility]    Specimen Information    Type Source Collected On   Blood  18 0558          Components       Value Reference Range Flag Lab   WBC 13.8 4.0 - 11.0 10e9/L H 59   RBC Count 4.33 4.4 - 5.9 10e12/L L 59   Hemoglobin " 12.7 13.3 - 17.7 g/dL L 59   Hematocrit 38.4 40.0 - 53.0 % L 59   MCV 89 78 - 100 fl  59   MCH 29.3 26.5 - 33.0 pg  59   MCHC 33.1 31.5 - 36.5 g/dL  59   RDW 13.2 10.0 - 15.0 %  59   Platelet Count 173 150 - 450 10e9/L  59   Diff Method Automated Method   59   % Neutrophils 89.4 %  59   % Lymphocytes 3.8 %  59   % Monocytes 6.5 %  59   % Eosinophils 0.1 %  59   % Basophils 0.1 %  59   % Immature Granulocytes 0.1 %  59   Absolute Neutrophil 12.3 1.6 - 8.3 10e9/L H 59   Absolute Lymphocytes 0.5 0.8 - 5.3 10e9/L L 59   Absolute Monocytes 0.9 0.0 - 1.3 10e9/L  59   Absolute Eosinophils 0.0 0.0 - 0.7 10e9/L  59   Absolute Basophils 0.0 0.0 - 0.2 10e9/L  59   Abs Immature Granulocytes 0.0 0 - 0.4 10e9/L  59            Urine Culture Aerobic Bacterial [[id number]]  Resulted: 05/11/18 0453, Result status: Preliminary result    Ordering provider: Mindi Fox PA-C  05/10/18 2148 Resulting lab: [facility]    Specimen Information    Type Source Collected On   Midstream Urine Urine clean catch 05/10/18 2320          Components       Value Reference Range Flag Lab   Specimen Description Midstream Urine      Special Requests Specimen received in preservative   226   Culture Micro PENDING               UA with Microscopic reflex to Culture [[id number]] (Abnormal)  Resulted: 05/11/18 0132, Result status: Final result    Ordering provider: Mindi Fox PA-C  05/10/18 2148 Resulting lab: [facility]    Specimen Information    Type Source Collected On   Midstream Urine Urine clean catch 05/10/18 2320          Components       Value Reference Range Flag Lab   Color Urine Yellow   59   Appearance Urine Clear   59   Glucose Urine 50 NEG^Negative mg/dL A 59   Bilirubin Urine Negative NEG^Negative  59   Ketones Urine Negative NEG^Negative mg/dL  59   Specific Gravity Urine 1.016 1.003 - 1.035  59   Blood Urine Negative NEG^Negative  59   pH Urine 6.0 5.0 - 7.0 pH  59   Protein Albumin Urine  Negative NEG^Negative mg/dL  59   Urobilinogen mg/dL 0.0 0.0 - 2.0 mg/dL  59   Nitrite Urine Negative NEG^Negative  59   Leukocyte Esterase Urine Negative NEG^Negative  59   Source Midstream Urine   59   WBC Urine <1 0 - 5 /HPF  59   RBC Urine 1 0 - 2 /HPF  59            CBC with platelets differential [[id number]] (Abnormal)  Resulted: 05/10/18 2005, Result status: Final result    Ordering provider: Osbaldo Clancy MD  05/10/18 1911 Resulting lab: [facility]    Specimen Information    Type Source Collected On   Blood  05/10/18 1913          Components       Value Reference Range Flag Lab   WBC 13.6 4.0 - 11.0 10e9/L H 59   RBC Count 4.97 4.4 - 5.9 10e12/L  59   Hemoglobin 14.6 13.3 - 17.7 g/dL  59   Hematocrit 43.7 40.0 - 53.0 %  59   MCV 88 78 - 100 fl  59   MCH 29.4 26.5 - 33.0 pg  59   MCHC 33.4 31.5 - 36.5 g/dL  59   RDW 13.6 10.0 - 15.0 %  59   Platelet Count 204 150 - 450 10e9/L  59   Diff Method Automated Method   59   % Neutrophils 87.3 %  59   % Lymphocytes 5.8 %  59   % Monocytes 6.2 %  59   % Eosinophils 0.4 %  59   % Basophils 0.2 %  59   % Immature Granulocytes 0.1 %  59   Absolute Neutrophil 11.8 1.6 - 8.3 10e9/L H 59   Absolute Lymphocytes 0.8 0.8 - 5.3 10e9/L  59   Absolute Monocytes 0.8 0.0 - 1.3 10e9/L  59   Absolute Eosinophils 0.1 0.0 - 0.7 10e9/L  59   Absolute Basophils 0.0 0.0 - 0.2 10e9/L  59   Abs Immature Granulocytes 0.0 0 - 0.4 10e9/L  59            Bilirubin direct [[id number]]  Resulted: 05/10/18 2001, Result status: Final result    Ordering provider: Osbaldo Clancy MD  05/10/18 1913 Resulting lab: [facility]    Specimen Information    Type Source Collected On     05/10/18 1913          Components       Value Reference Range Flag Lab   Bilirubin Direct <0.1 0.0 - 0.2 mg/dL  59            Comprehensive metabolic panel [[id number]] (Abnormal)  Resulted: 05/10/18 2001, Result status: Final result    Ordering provider: Osbaldo Clancy MD  05/10/18 2858  Resulting lab: [facility]    Specimen Information    Type Source Collected On   Blood  05/10/18 1913          Components       Value Reference Range Flag Lab   Sodium 144 133 - 144 mmol/L  59   Potassium 4.4 3.4 - 5.3 mmol/L  59   Chloride 114 94 - 109 mmol/L H 59   Carbon Dioxide 24 20 - 32 mmol/L  59   Anion Gap 6 3 - 14 mmol/L  59   Glucose 126 70 - 99 mg/dL H 59   Urea Nitrogen 37 7 - 30 mg/dL H 59   Creatinine 1.50 0.66 - 1.25 mg/dL H 59   GFR Estimate 44 >60 mL/min/1.7m2 L 59   Comment:  Non  GFR Calc   GFR Estimate If Black 54 >60 mL/min/1.7m2 L 59   Comment:  African American GFR Calc   Calcium 8.9 8.5 - 10.1 mg/dL  59   Bilirubin Total 0.2 0.2 - 1.3 mg/dL  59   Albumin 3.8 3.4 - 5.0 g/dL  59   Protein Total 7.5 6.8 - 8.8 g/dL  59   Alkaline Phosphatase 66 40 - 150 U/L  59   ALT 34 0 - 70 U/L  59   AST 22 0 - 45 U/L  59            Troponin I [[id number]]  Resulted: 05/10/18 2001, Result status: Final result    Ordering provider: Osbaldo Clancy MD  05/10/18 1911 Resulting lab: [facility]    Specimen Information    Type Source Collected On   Blood  05/10/18 1913          Components       Value Reference Range Flag Lab   Troponin I ES <0.015 0.000 - 0.045 ug/L  59   Comment:         The 99th percentile for upper reference range is 0.045 ug/L.  Troponin values   in the range of 0.045 - 0.120 ug/L may be associated with risks of adverse   clinical events.              Lactic acid whole blood [[id number]]  Resulted: 05/10/18 1938, Result status: Final result    Ordering provider: Osbaldo Clancy MD  05/10/18 1932 Resulting lab: [facility]    Specimen Information    Type Source Collected On   Blood  05/10/18 1913          Components       Value Reference Range Flag Lab   Lactic Acid 1.4 0.7 - 2.0 mmol/L  59            Testing Performed By     Lab - Abbreviation Name Director Address Valid Date Range    59 - Unknown [facility]  Unknown [address] [facility] [address] 12/31/14 1006 - Present    226 - Unknown [facility] Unknown [address] [facility] [address] 12/19/14 0955 - Present            Unresulted Labs (24h ago through future)    Start       Ordered    05/10/18 2330  Sputum Culture Aerobic Bacterial  (Sputum Culture)  ROUTINE,   Routine     Comments:  For EXPECTORATED SPUTUM, a Gram Stain ([id number]) must be ordered.    05/10/18 2324    05/10/18 2330  Gram stain  (Sputum Culture)  ROUTINE,   Routine      05/10/18 2324         Imaging Results - 3 Days      Chest 2 Views* [[id number]]  Resulted: 05/11/18 1112, Result status: Final result    Ordering provider: Campbell Banks MD  05/11/18 0730 Resulted by: Campbell Osborne MD    Performed: 05/11/18 1050 - 05/11/18 1105 Resulting lab: RADIOLOGY RESULTS    Narrative:       XR CHEST 2 VW 5/11/2018 11:05 AM    HISTORY: Cough. Question aspiration.    COMPARISON: 5/10/2018.      Impression:       IMPRESSION: 2 views of the chest show no acute cardiopulmonary  disease. Low lung volumes are again demonstrated on the lateral view.    CAMPBELL OSBORNE MD      Chest XR,  PA & LAT [[id number]]  Resulted: 05/10/18 2240, Result status: Final result    Ordering provider: Osbaldo Clancy MD  05/10/18 1930 Resulted by: Daisha Beasley MD    Performed: 05/10/18 2041 - 05/10/18 2049 Resulting lab: RADIOLOGY RESULTS    Narrative:       CHEST TWO VIEWS   5/10/2018 8:49 PM     COMPARISON: None.    HISTORY: Cough.     FINDINGS: The cardiac silhouette, pulmonary vasculature, lungs and  pleural spaces are within normal limits.      Impression:       IMPRESSION: Clear lungs.    DAISHA BEASLEY MD      Testing Performed By     Lab - Abbreviation Name Director Address Valid Date Range    104 - Rad Rslts RADIOLOGY RESULTS Unknown Unknown 02/16/05 1553 - Present            Encounter-Level Documents:     There are no encounter-level documents.      Order-Level Documents:     There are no  order-level documents.

## 2018-05-11 NOTE — UTILIZATION REVIEW
Trav Perla   Admission Status; Secondary Review Determination     Admission Date: 5/10/2018  7:02 PM  Date of Review: 5/11/2018     Under the authority of the Utilization Management Committee, the utilization review process indicated a secondary review on the above patient.  The review outcome is based on review of the medical records, discussions with staff, and applying clinical experience noted on the date of the review.       (x) Observation Status Appropriate - This patient does not meet hospital inpatient criteria and is placed in observation status.    RATIONALE FOR DETERMINATION     86 year old male with a history of Alzheimer's dementia, bipolar d/o, chronic kidney disease stage 3, gastroesophageal reflux disease, hypothyroidism and prior TIA presents with cough, nausea and possible choking/aspiration event with eating. Hospitalized for possible pneumonitis associated with leucocytosis but no hypoxia and negative CXR. Started on IV Unasyn. Discussed with attending, Dr. Banks, and after one night, patient remains stable and appears at baseline. A swallow eval is planned today, but it is anticipated that patient will be able to discharge after that. The severity of illness, intensity of service provided, expected LOS and risk for adverse outcome make the care appropriate for further observation; however, doesn't meet criteria for hospital inpatient admission. Dr. Banks concurs with observation.       The information on this document is developed by the utilization review team in order for the business office to ensure compliance.  This only denotes the appropriateness of proper admission status and does not reflect the quality of care rendered.         The definitions of Inpatient Status and Observation Status used in making the determination above are those provided in the CMS Coverage Manual, Chapter 1 and Chapter 6, section 70.4.      Sincerely,     Taj Olguin MD    Physician Advisor -  Utilization Review  [facility].

## 2018-05-11 NOTE — CONSULTS
Care Transitions:  Patient has  [place] to Observation  order. Patient has been given Patient Bill of Rights, Observation brochure and  What does Observation mean to me  forms.  Patient has been given the opportunity to ask questions about observation status and their plan of care.   Penny Vidal RN, Care Coordinator [phone]

## 2018-05-11 NOTE — PROGRESS NOTES
[facility]           Assessment and Plan:     Tian Espitia is a 86 year old male with PMH significant for Alzheimer's disease, Bipolar, CKD stage III, depression, GERD, hypothyroidism , hx of TIA who now presents with cough and hx of aspiration pneumonia.        Pneumonitis due to inhalation of food or vomitus (H)    5/10/18 -- Presents with cough after questionable choking episode and nausea.  VS reveal mild tachycardia.  KARLIE as below.  WBC 13.5.  EKG with left bundle branch block.  Unclear if this is new; no cardiac complaints.  CXR without infiltrate.  Given 500cc NS in ED.  Started on Unasyn.  5/11/2018 --UA negative.  Chest x-ray remains negative overall looks like an aspiration pneumonitis with no clear pneumonia present.  Patient appears near baseline.  Will discharge with 5 more days of augmentin and plan for dysphagia diet level 1/nectar as below.       Dysphagia   5/11/2018 -- per speech evaluation today recommended pureed solids and nectar consistency liquids.   (Dysphagia diet level 1/nectar) - will plan for this on discharge.      KARLIE (acute kidney injury) (H) with underlying Chronic kidney disease, unspecified CKD stage  5/10/18 -- Creatinine 1.0 - 1.3. 1.5 on admission, continue IVF  5/11/2018 -- back to baseline.       Alzheimer's disease  Bipolar disorder (H)  Depressive disorder  5/10/18 -- Severe dementia at baseline.  - continue PTA Effexor  5/11/2018 -- appears baseline.         Transient cerebral ischemia  - continue PTA aspirin      Hypothyroidism, unspecified type  - continue PTA levothyroxine      Gastroesophageal reflux disease without esophagitis  Not on medication    Code Status: DNR / DNI     Disposition  Discharge back to LTC.              Interval History:   Improving.  Doing well on room air.  Patient answering questions about current symptoms consistently and appropriately although occasionally needing redirection.  Not able to answer any historical  "questions, and only oriented x 1 although when told he's in the hospital says \"that sounds right\".  No fever or chills.  No pain.  No dyspnea.  On room air.  Mental status appears baseline.              Review of Systems:    ROS: 10 point ROS neg other than the symptoms noted above in the HPI.             Medications:   Current active medications and PTA medications reviewed, see medication list for details.            Physical Exam:   Vitals were reviewed  Patient Vitals for the past 24 hrs:   BP Temp Temp src Heart Rate Resp SpO2 Height Weight   18 0346 151/77 98.5  F (36.9  C) Oral 71 18 97 % - -   05/10/18 2300 - - - - - - 1.727 m (5' 8\") 63.5 kg (139 lb 15.9 oz)   05/10/18 2230 (!) 158/91 99.4  F (37.4  C) Oral 103 20 94 % - -   05/10/18 2200 (!) 148/96 - - 92 24 94 % - -   05/10/18 2130 (!) 150/102 - - 106 14 94 % - -   05/10/18 2115 (!) 178/105 - - - - - - -   05/10/18 2030 - - - 93 - 96 % - -   05/10/18 2015 (!) 177/106 - - 99 - - - -   05/10/18 2000 (!) 157/126 - - 108 - 95 % - -   05/10/18 194 (!) 170/108 - - 86 - - - -   05/10/18 1930 (!) 175/104 - - 80 - 93 % - -   05/10/18 1915 - - - 86 - 94 % - -   05/10/18 1910 (!) 187/106 99  F (37.2  C) Oral 99 18 94 % - -       Temperatures:  Current - Temp: 98.5  F (36.9  C); Max - Temp  Av  F (37.2  C)  Min: 98.5  F (36.9  C)  Max: 99.4  F (37.4  C)  Respiration range: Resp  Av.8  Min: 14  Max: 24  Pulse range: No Data Recorded  Blood pressure range: Systolic (24hrs), Av , Min:148 , Max:187   ; Diastolic (24hrs), Av, Min:77, Max:126    Pulse oximetry range: SpO2  Av.6 %  Min: 93 %  Max: 97 %  I/O last 3 completed shifts:  In: 630 [P.O.:30; I.V.:600]  Out: 200 [Urine:200]    Intake/Output Summary (Last 24 hours) at 18 0740  Last data filed at 18 05   Gross per 24 hour   Intake              630 ml   Output              200 ml   Net              430 ml     EXAM:  General: awake and alert, NAD, oriented x 1  Head: " normocephalic  Neck: unremarkable, no lymphadenopathy   HEENT: oropharynx pink and moist    Heart: Regular rate and rhythm, no murmurs, rubs, or gallops  Lungs: clear to auscultation bilaterally with good air movement throughout  Abdomen: soft, non-tender, no masses or organomegaly  Extremities: no edema in lower extremities   Skin unremarkable.               Data:     Results for orders placed or performed during the hospital encounter of 05/10/18 (from the past 24 hour(s))   CBC with platelets differential   Result Value Ref Range    WBC 13.6 (H) 4.0 - 11.0 10e9/L    RBC Count 4.97 4.4 - 5.9 10e12/L    Hemoglobin 14.6 13.3 - 17.7 g/dL    Hematocrit 43.7 40.0 - 53.0 %    MCV 88 78 - 100 fl    MCH 29.4 26.5 - 33.0 pg    MCHC 33.4 31.5 - 36.5 g/dL    RDW 13.6 10.0 - 15.0 %    Platelet Count 204 150 - 450 10e9/L    Diff Method Automated Method     % Neutrophils 87.3 %    % Lymphocytes 5.8 %    % Monocytes 6.2 %    % Eosinophils 0.4 %    % Basophils 0.2 %    % Immature Granulocytes 0.1 %    Absolute Neutrophil 11.8 (H) 1.6 - 8.3 10e9/L    Absolute Lymphocytes 0.8 0.8 - 5.3 10e9/L    Absolute Monocytes 0.8 0.0 - 1.3 10e9/L    Absolute Eosinophils 0.1 0.0 - 0.7 10e9/L    Absolute Basophils 0.0 0.0 - 0.2 10e9/L    Abs Immature Granulocytes 0.0 0 - 0.4 10e9/L   Comprehensive metabolic panel   Result Value Ref Range    Sodium 144 133 - 144 mmol/L    Potassium 4.4 3.4 - 5.3 mmol/L    Chloride 114 (H) 94 - 109 mmol/L    Carbon Dioxide 24 20 - 32 mmol/L    Anion Gap 6 3 - 14 mmol/L    Glucose 126 (H) 70 - 99 mg/dL    Urea Nitrogen 37 (H) 7 - 30 mg/dL    Creatinine 1.50 (H) 0.66 - 1.25 mg/dL    GFR Estimate 44 (L) >60 mL/min/1.7m2    GFR Estimate If Black 54 (L) >60 mL/min/1.7m2    Calcium 8.9 8.5 - 10.1 mg/dL    Bilirubin Total 0.2 0.2 - 1.3 mg/dL    Albumin 3.8 3.4 - 5.0 g/dL    Protein Total 7.5 6.8 - 8.8 g/dL    Alkaline Phosphatase 66 40 - 150 U/L    ALT 34 0 - 70 U/L    AST 22 0 - 45 U/L   Troponin I   Result Value Ref  Range    Troponin I ES <0.015 0.000 - 0.045 ug/L   Lactic acid whole blood   Result Value Ref Range    Lactic Acid 1.4 0.7 - 2.0 mmol/L   Bilirubin direct   Result Value Ref Range    Bilirubin Direct <0.1 0.0 - 0.2 mg/dL   Chest XR,  PA & LAT    Narrative    CHEST TWO VIEWS   5/10/2018 8:49 PM     COMPARISON: None.    HISTORY: Cough.     FINDINGS: The cardiac silhouette, pulmonary vasculature, lungs and  pleural spaces are within normal limits.      Impression    IMPRESSION: Clear lungs.    DAISHA BEASLEY MD   UA with Microscopic reflex to Culture   Result Value Ref Range    Color Urine Yellow     Appearance Urine Clear     Glucose Urine 50 (A) NEG^Negative mg/dL    Bilirubin Urine Negative NEG^Negative    Ketones Urine Negative NEG^Negative mg/dL    Specific Gravity Urine 1.016 1.003 - 1.035    Blood Urine Negative NEG^Negative    pH Urine 6.0 5.0 - 7.0 pH    Protein Albumin Urine Negative NEG^Negative mg/dL    Urobilinogen mg/dL 0.0 0.0 - 2.0 mg/dL    Nitrite Urine Negative NEG^Negative    Leukocyte Esterase Urine Negative NEG^Negative    Source Midstream Urine     WBC Urine <1 0 - 5 /HPF    RBC Urine 1 0 - 2 /HPF   Urine Culture Aerobic Bacterial   Result Value Ref Range    Specimen Description Midstream Urine     Special Requests Specimen received in preservative     Culture Micro PENDING    Care Transition RN/SW IP Consult    Narrative    Penny Vidal RN     5/11/2018 10:42 AM  Care Transitions:  Patient has  [place] to Observation  order. Patient has been   given Patient Bill of Rights, Observation brochure and  What does   Observation mean to me  forms.  Patient has been given the   opportunity to ask questions about observation status and their   plan of care.   Penny Vidal RN, Care Coordinator [phone]   Basic metabolic panel   Result Value Ref Range    Sodium 145 (H) 133 - 144 mmol/L    Potassium 4.1 3.4 - 5.3 mmol/L    Chloride 116 (H) 94 - 109 mmol/L    Carbon Dioxide 23 20 - 32 mmol/L    Anion  Gap 6 3 - 14 mmol/L    Glucose 114 (H) 70 - 99 mg/dL    Urea Nitrogen 30 7 - 30 mg/dL    Creatinine 1.20 0.66 - 1.25 mg/dL    GFR Estimate 57 (L) >60 mL/min/1.7m2    GFR Estimate If Black 69 >60 mL/min/1.7m2    Calcium 8.4 (L) 8.5 - 10.1 mg/dL   CBC with platelets differential   Result Value Ref Range    WBC 13.8 (H) 4.0 - 11.0 10e9/L    RBC Count 4.33 (L) 4.4 - 5.9 10e12/L    Hemoglobin 12.7 (L) 13.3 - 17.7 g/dL    Hematocrit 38.4 (L) 40.0 - 53.0 %    MCV 89 78 - 100 fl    MCH 29.3 26.5 - 33.0 pg    MCHC 33.1 31.5 - 36.5 g/dL    RDW 13.2 10.0 - 15.0 %    Platelet Count 173 150 - 450 10e9/L    Diff Method Automated Method     % Neutrophils 89.4 %    % Lymphocytes 3.8 %    % Monocytes 6.5 %    % Eosinophils 0.1 %    % Basophils 0.1 %    % Immature Granulocytes 0.1 %    Absolute Neutrophil 12.3 (H) 1.6 - 8.3 10e9/L    Absolute Lymphocytes 0.5 (L) 0.8 - 5.3 10e9/L    Absolute Monocytes 0.9 0.0 - 1.3 10e9/L    Absolute Eosinophils 0.0 0.0 - 0.7 10e9/L    Absolute Basophils 0.0 0.0 - 0.2 10e9/L    Abs Immature Granulocytes 0.0 0 - 0.4 10e9/L           Attestation:  I have reviewed today's vital signs, notes, medications, labs and imaging.  Amount of time performed on this discharge summary: 40 minutes.     Alexandro Banks MD, MD

## 2018-05-11 NOTE — ED PROVIDER NOTES
History     Chief Complaint   Patient presents with     Cough     Irregular Heart Beat     HPI  Tian Espitia is a 86 year old male who presents with cough ?productive of foul sputum from NH, history of dementia, bipolar disorder, aspiration pneumonia.  The patient is unable to give me a history and his family was directed by the nursing home to the wrong ED hence they arrived later in my history initially was limited.      On their arrival I was able to obtain more history.  And the patient's wife notes that she was able to eat with him earlier in the day.  She helps feed him.  He tolerated the food well.  He appeared at that time to be in his usual state of health.  Patient has had no recent hospitalizations.     He was last seen for a nursing home visit on May 7.  Problem List:    Patient Active Problem List    Diagnosis Date Noted     Hypothyroidism, unspecified type 03/12/2018     Priority: Medium     Gastroesophageal reflux disease without esophagitis 03/12/2018     Priority: Medium     Other dysphagia 03/12/2018     Priority: Medium     Other constipation 03/12/2018     Priority: Medium     Chronic kidney disease, unspecified CKD stage 03/12/2018     Priority: Medium     Rash and other nonspecific skin eruption 07/05/2012     Priority: Medium     Pneumonitis due to inhalation of food or vomitus (H) 12/30/2007     Priority: Medium     Hyperosmolality and/or hypernatremia 12/28/2007     Priority: Medium     Leukocytosis 12/28/2007     Priority: Medium     Altered mental status 12/27/2007     Priority: Medium     Alzheimer's disease 03/21/2007     Priority: Medium     Overview:   memory loss since at least 2003.  Formal evaluation 5/2005 by Dr Santana - B12, VDRL, folic acid all normal --> Azheimer's vs multi-infarct dementia.  Also seen by Dr. Almeida --> notes say an MRI was normal at this time but there are no primary docs.       Bipolar disorder (H) 03/21/2007     Priority: Medium     Depressive disorder  03/21/2007     Priority: Medium     Overview:   on lithium.  Follows with Rachel Gongora at [facility].       Other persistent mental disorders due to conditions classified elsewhere 03/21/2007     Priority: Medium     Overview:   Dementia       Transient cerebral ischemia 03/21/2007     Priority: Medium     Overview:   March 2007.  Holter monitor done with unremarkable results          Past Medical History:    Past Medical History:   Diagnosis Date     Alzheimer disease      Bipolar disorder (H)      CKD (chronic kidney disease)      Depressive disorder      Hyperlipemia      Mental disorder      Paralysis agitans (H)        Past Surgical History:    Past Surgical History:   Procedure Laterality Date     HERNIA REPAIR       IR GASTRO JEJUNOSTOMY TUBE PLACEMENT  01/04/2008       Family History:    Family History   Problem Relation Age of Onset     CEREBROVASCULAR DISEASE Mother        Social History:  Marital Status:   [2]  Social History   Substance Use Topics     Smoking status: Never Smoker     Smokeless tobacco: Never Used     Alcohol use No        Medications:      ACETAMINOPHEN PO   ASPIRIN PO   fluticasone (FLONASE) 50 MCG/ACT spray   LEVOTHYROXINE SODIUM PO   polyethylene glycol (MIRALAX/GLYCOLAX) powder   venlafaxine (EFFEXOR-XR) 75 MG 24 hr capsule   hypromellose (ARTIFICIAL TEARS) 0.4 % SOLN ophthalmic solution   sennosides (SENOKOT) 8.6 MG tablet         Review of Systems   Unable to perform ROS: Dementia       Physical Exam   BP: (!) 187/106  Heart Rate: 99  Temp: 99  F (37.2  C)  Resp: 18  SpO2: 94 %      Physical Exam   Constitutional: No distress.   HENT:   Head: Atraumatic.   Mouth/Throat: Oropharynx is clear and moist.   Eyes: Conjunctivae are normal.   Neck: Neck supple.   Cardiovascular: An irregular rhythm present. Tachycardia present.    No murmur heard.  Pulmonary/Chest: Effort normal and breath sounds normal. No respiratory distress. He has no wheezes. He has no rales.   Abdominal: Soft. He  exhibits no distension. There is no tenderness. There is no rebound and no guarding.   Musculoskeletal: He exhibits no edema.   Neurological: He is alert.   Skin: No rash noted. He is not diaphoretic.          ED Course     ED Course     Procedures                  EKG Interpretation:      Interpreted by Osbaldo Clancy MD  EKG done at 1920 hrs. demonstrates a sinus rhythm at 86 bpm with a leftward axis.   Findings of left bundle branch block with characteristic R progression and ST and T-wave segment changes but negative for scar Yakelin criteria normal intervals.  Wide complex with QRS of 144.  No ectopy.  Impression sinus rhythm 86 bpm left bundle branch block and old EKG to compare.     Critical Care time:  none               Results for orders placed or performed during the hospital encounter of 05/10/18 (from the past 24 hour(s))   CBC with platelets differential   Result Value Ref Range    WBC 13.6 (H) 4.0 - 11.0 10e9/L    RBC Count 4.97 4.4 - 5.9 10e12/L    Hemoglobin 14.6 13.3 - 17.7 g/dL    Hematocrit 43.7 40.0 - 53.0 %    MCV 88 78 - 100 fl    MCH 29.4 26.5 - 33.0 pg    MCHC 33.4 31.5 - 36.5 g/dL    RDW 13.6 10.0 - 15.0 %    Platelet Count 204 150 - 450 10e9/L    Diff Method Automated Method     % Neutrophils 87.3 %    % Lymphocytes 5.8 %    % Monocytes 6.2 %    % Eosinophils 0.4 %    % Basophils 0.2 %    % Immature Granulocytes 0.1 %    Absolute Neutrophil 11.8 (H) 1.6 - 8.3 10e9/L    Absolute Lymphocytes 0.8 0.8 - 5.3 10e9/L    Absolute Monocytes 0.8 0.0 - 1.3 10e9/L    Absolute Eosinophils 0.1 0.0 - 0.7 10e9/L    Absolute Basophils 0.0 0.0 - 0.2 10e9/L    Abs Immature Granulocytes 0.0 0 - 0.4 10e9/L   Comprehensive metabolic panel   Result Value Ref Range    Sodium 144 133 - 144 mmol/L    Potassium 4.4 3.4 - 5.3 mmol/L    Chloride 114 (H) 94 - 109 mmol/L    Carbon Dioxide 24 20 - 32 mmol/L    Anion Gap 6 3 - 14 mmol/L    Glucose 126 (H) 70 - 99 mg/dL    Urea Nitrogen 37 (H) 7 - 30 mg/dL    Creatinine 1.50  (H) 0.66 - 1.25 mg/dL    GFR Estimate 44 (L) >60 mL/min/1.7m2    GFR Estimate If Black 54 (L) >60 mL/min/1.7m2    Calcium 8.9 8.5 - 10.1 mg/dL    Bilirubin Total 0.2 0.2 - 1.3 mg/dL    Albumin 3.8 3.4 - 5.0 g/dL    Protein Total 7.5 6.8 - 8.8 g/dL    Alkaline Phosphatase 66 40 - 150 U/L    ALT 34 0 - 70 U/L    AST 22 0 - 45 U/L   Troponin I   Result Value Ref Range    Troponin I ES <0.015 0.000 - 0.045 ug/L   Lactic acid whole blood   Result Value Ref Range    Lactic Acid 1.4 0.7 - 2.0 mmol/L   Bilirubin direct   Result Value Ref Range    Bilirubin Direct <0.1 0.0 - 0.2 mg/dL   Chest XR,  PA & LAT    Narrative    CHEST TWO VIEWS   5/10/2018 8:49 PM     COMPARISON: None.    HISTORY: Cough.     FINDINGS: The cardiac silhouette, pulmonary vasculature, lungs and  pleural spaces are within normal limits.      Impression    IMPRESSION: Clear lungs.    DAISHA BEASLEY MD   UA with Microscopic reflex to Culture   Result Value Ref Range    Color Urine Yellow     Appearance Urine Clear     Glucose Urine 50 (A) NEG^Negative mg/dL    Bilirubin Urine Negative NEG^Negative    Ketones Urine Negative NEG^Negative mg/dL    Specific Gravity Urine 1.016 1.003 - 1.035    Blood Urine Negative NEG^Negative    pH Urine 6.0 5.0 - 7.0 pH    Protein Albumin Urine Negative NEG^Negative mg/dL    Urobilinogen mg/dL 0.0 0.0 - 2.0 mg/dL    Nitrite Urine Negative NEG^Negative    Leukocyte Esterase Urine Negative NEG^Negative    Source Midstream Urine     WBC Urine <1 0 - 5 /HPF    RBC Urine 1 0 - 2 /HPF   Urine Culture Aerobic Bacterial   Result Value Ref Range    Specimen Description Midstream Urine     Special Requests Specimen received in preservative     Culture Micro PENDING    Care Transition RN/SW IP Consult    Narrative    Penny Vidal RN     5/11/2018 10:42 AM  Care Transitions:  Patient has  [place] to Observation  order. Patient has been   given Patient Bill of Rights, Observation brochure and  What does   Observation mean to me   forms.  Patient has been given the   opportunity to ask questions about observation status and their   plan of care.   Penny Vidal RN, Care Coordinator [phone]   Basic metabolic panel   Result Value Ref Range    Sodium 145 (H) 133 - 144 mmol/L    Potassium 4.1 3.4 - 5.3 mmol/L    Chloride 116 (H) 94 - 109 mmol/L    Carbon Dioxide 23 20 - 32 mmol/L    Anion Gap 6 3 - 14 mmol/L    Glucose 114 (H) 70 - 99 mg/dL    Urea Nitrogen 30 7 - 30 mg/dL    Creatinine 1.20 0.66 - 1.25 mg/dL    GFR Estimate 57 (L) >60 mL/min/1.7m2    GFR Estimate If Black 69 >60 mL/min/1.7m2    Calcium 8.4 (L) 8.5 - 10.1 mg/dL   CBC with platelets differential   Result Value Ref Range    WBC 13.8 (H) 4.0 - 11.0 10e9/L    RBC Count 4.33 (L) 4.4 - 5.9 10e12/L    Hemoglobin 12.7 (L) 13.3 - 17.7 g/dL    Hematocrit 38.4 (L) 40.0 - 53.0 %    MCV 89 78 - 100 fl    MCH 29.3 26.5 - 33.0 pg    MCHC 33.1 31.5 - 36.5 g/dL    RDW 13.2 10.0 - 15.0 %    Platelet Count 173 150 - 450 10e9/L    Diff Method Automated Method     % Neutrophils 89.4 %    % Lymphocytes 3.8 %    % Monocytes 6.5 %    % Eosinophils 0.1 %    % Basophils 0.1 %    % Immature Granulocytes 0.1 %    Absolute Neutrophil 12.3 (H) 1.6 - 8.3 10e9/L    Absolute Lymphocytes 0.5 (L) 0.8 - 5.3 10e9/L    Absolute Monocytes 0.9 0.0 - 1.3 10e9/L    Absolute Eosinophils 0.0 0.0 - 0.7 10e9/L    Absolute Basophils 0.0 0.0 - 0.2 10e9/L    Abs Immature Granulocytes 0.0 0 - 0.4 10e9/L   Chest 2 Views*    Narrative    XR CHEST 2 VW 5/11/2018 11:05 AM    HISTORY: Cough. Question aspiration.    COMPARISON: 5/10/2018.      Impression    IMPRESSION: 2 views of the chest show no acute cardiopulmonary  disease. Low lung volumes are again demonstrated on the lateral view.    CAMPBELL OSBORNE MD       Medications   aspirin chewable tablet 81 mg (81 mg Oral Given 5/10/18 5121)   levothyroxine (SYNTHROID/LEVOTHROID) tablet 88 mcg (88 mcg Oral Given 5/11/18 0830)   naloxone (NARCAN) injection 0.1-0.4 mg (not administered)    melatonin tablet 1 mg (not administered)   acetaminophen (TYLENOL) tablet 650 mg (650 mg Oral Given 5/10/18 2341)   ondansetron (ZOFRAN-ODT) ODT tab 4 mg (not administered)     Or   ondansetron (ZOFRAN) injection 4 mg (not administered)   prochlorperazine (COMPAZINE) injection 5 mg (not administered)     Or   prochlorperazine (COMPAZINE) tablet 5 mg (not administered)     Or   prochlorperazine (COMPAZINE) Suppository 12.5 mg (not administered)   ampicillin-sulbactam (UNASYN) 3 g vial to attach to  mL bag (3 g Intravenous New Bag 5/11/18 1127)   venlafaxine (EFFEXOR-ER) 24 hr tablet 75 mg (75 mg Oral Given 5/11/18 0830)   sodium chloride 0.45% infusion ( Intravenous New Bag 5/11/18 0837)   0.9% sodium chloride BOLUS (500 mLs Intravenous New Bag 5/10/18 2142)   ampicillin-sulbactam (UNASYN) 3 g vial to attach to  mL bag (0 g Intravenous Stopped 5/10/18 2251)       Assessments & Plan (with Medical Decision Making)     MDM: Tian Espitia is a 86 year old male who presented with advanced dementia and with a concern for possible recurrent aspiration pneumonia for the most part nonverbal and coughing up some foul-appearing sputum that was witnessed in the emergency department.  He is mildly tachycardic and no associated significant respiratory distress.  He is afebrile currently.  He does not have sirs criteria.  He is normotensive.  His chest x-ray demonstrates no infiltrates in his examination is relatively unremarkable.  I suspect he may still have an aspiration pneumonia and have given him empirically Unasyn along with a small fluid bolus.  His BUN/creatinine would suggest that he is prerenal.    I discussed his status with family regarding whether treating with antibiotics but more comfort measures at the nursing home given his advanced dementia state.  As an alternative admitting to the [facility] for either an observation or admission to initiate antibiotics in the hospital first and serially evaluate and  examine.  They prefer that he be in the hospital setting for the start of this.  They understand this may be an observation stay.  I discussed with Mindi Fox who accepts the patient         ED to Inpatient Handoff:    Discussed with Mindi Fox   Patient accepted for Inpatient Stay  Pending studies include none  Code Status: Not Addressed           I have reviewed the nursing notes.    I have reviewed the findings, diagnosis, plan and need for follow up with the patient.       New Prescriptions    No medications on file       Final diagnoses:   Aspiration pneumonia due to inhalation of vomitus (H)       5/10/2018   [facility] EMERGENCY DEPARTMENT     Osbaldo Clancy MD  05/11/18 1146

## 2018-05-11 NOTE — CONSULTS
Care Transition Initial Assessment - RN  Reason For Consult: other (see comments) (Nursing Home LTC Resident)   Met with: Spoke with wifeKatey.    DATA   Principal Problem:    Pneumonitis due to inhalation of food or vomitus (H)  Active Problems:    Alzheimer's disease    Bipolar disorder (H)    Depressive disorder    Transient cerebral ischemia    Hypothyroidism, unspecified type    Gastroesophageal reflux disease without esophagitis    Chronic kidney disease, unspecified CKD stage    KARLIE (acute kidney injury) (H)    Aspiration pneumonia (H)       Primary Care Clinic Name: [facility]  Primary Care MD Name: Martín Chiu  Contact information and PCP information verified: Yes    ASSESSMENT  Cognitive Status: confused.       Resources List: Skilled Nursing Facility     Lives With: facility resident  Living Arrangements: extended care facility     Description of Support System: Supportive, Involved   Who is your support system?: Wife   Support Assessment: Adequate family and caregiver support   Insurance Concerns: No Insurance issues identified      This writer spoke with wife Katey, introduced self and role.  Discussed discharge planning and Medicare guidelines in regards to home care, TCU and LTC.  The patient lives at [facility] (Phone: [phone] Admissions: [phone] Fax: [phone]).  Confirmed the patient has a bed hold.  He will return to [facility] upon discharge.  Transportation will be provided by [employer].    PLAN    Return to [facility] today      Discharge Planner   Discharge Plans in progress: LT  Barriers to discharge plan: None  Follow up plan: Follow up with PCP       Entered by: Penny Vidal 05/11/2018 11:01 AM           Penny Vidal RN, Care Coordinator [phone]

## 2018-05-11 NOTE — PROGRESS NOTES
Impression:  Pt presents with moderate oral phase dysphagia characterized by decreased coordination of bolus and prolonged mastication and preparation for swallow.  Pt masticated small piece of trent cracker for over a minute before swallowing.  Mild-moderate pharyngeal dysphagia with aspiration symptoms with thin liquid with cough.  Pt unable to follow directions for swallow strategies to increase safety due to cognitive status.  Diet recommendation: pureed solids and nectar consistency liquids.   (Dysphagia diet level 1/nectar)       Bedside Swallow Evaluation  05/11/18    General Information   Onset Date 05/10/18   Start of Care Date 05/11/18   Referring Physician SHAI Chappell   Patient Profile Review/OT: Additional Occupational Profile Info See Profile for full history and prior level of function   Patient/Family Goals Statement pt unable to state due to cognitive status   Swallowing Evaluation Bedside swallow evaluation   Behaviorial Observations Alert   Mode of current nutrition NPO  (until swallow evaluation)   Respiratory Status Room air   Comments Pt brought to ER due to coughing episode where he lives in Memory Care Unit.  Admitted under observation status for possible aspiration episode.   Clinical Swallow Evaluation   Oral Musculature unable to assess due to poor participation/comprehension   Structural Abnormalities none present   Dentition edentulous, does not have dentures   Mucosal Quality adequate   Mandibular Strength and Mobility impaired   Oral Labial Strength and Mobility WFL   Lingual Strength and Mobility impaired anterior elevation   Additional Documentation Yes   Clinical Swallow Eval: Thin Liquid Texture Trial   Mode of Presentation, Thin Liquids cup;self-fed   Volume of Liquid or Food Presented sip of water   Oral Phase of Swallow WFL;Premature pharyngeal entry   Pharyngeal Phase of Swallow impaired;coughing/choking   Diagnostic Statement Delayed cough after swallow.     Clinical  Swallow Eval: Nectar Thick Liquid Texture Trial   Mode of Presentation, Nectar cup;self-fed   Volume of Nectar Presented 2 sips   Oral Phase, Nectar WFL   Pharyngeal Phase, [place] intact   Diagnostic Statement Mild oral delay but WFL   Clinical Swallow Eval: Pudding Thick Liquid Texture Trial   Mode of Presentation, Pudding cup;self-fed   Volume of Pudding Presented 2 bites   Oral Phase, Pudding WFL   Pharyngeal Phase, Pudding intact   Diagnostic Statement Mild- moderate delay with oral preparation.  WFL.  No overt penetration or aspiration symptoms.   Clinical Swallow Eval: Solid Food Texture Trial   Mode of Presentation, Solid self-fed   Volume of Solid Food Presented less than 1/8 trent cracker   Oral Phase, Solid (prolonged oral phase)   Pharyngeal Phase, Solid intact   Diagnostic Statement Poor oral prepartation phase with prolonged mastication of small bite of cracker for over a minute.     Swallow Eval: Clinical Impressions   Skilled Criteria for Therapy Intervention No significant expected  improvement in functional status   Functional Assessment Scale (FAS) 4   Treatment Diagnosis Moderate oral, mild-moderate pharyngeal dysphagia   Diet texture recommendations Dysphagia diet level 1;Nectar thick liquids   Anticipated Discharge Disposition extended care facility  (Memory care unit)   Clinical Impression Comments Pt presents with moderate oral phase dysphagia characterized by decreased coordination of bolus and prolonged mastication and preparation for swallow.  Pt masticated small piece of trent cracker for over a minute before swallowing.  Mild-moderate pharyngeal dysphagia with aspiration symptoms with thin liquid with cough.  Pt unable to follow directions for swallow strategies to increase safety due to cognitive status.  Diet recommendation: pureed solids/nectar liquids.   (Dysphagia diet level 1/nectar)   1x Eval Only-Outpatient/Observation Medicare G-Code   G-code Swallowing   Swallowing    Swallowing:  Current Status , Goal , Discharge -Izsj Only-Modifier the same for all G-codes CK: 40-59% impairment   Swallowing: Current  & Discharge Modifier Rationale-Eval Only outcome measure tool level 4   Total Evaluation Time   Total Evaluation Time (Minutes) 30

## 2018-05-11 NOTE — PROGRESS NOTES
Skin affirmation note    Admitting nurse completed full skin assessment, Bijan score and Bijan interventions. This writer agrees with the initial skin assessment findings.

## 2018-05-11 NOTE — H&P
[facility]    History and Physical  Hospital Medicine       Date of Admission:  5/10/2018  Date of Service: 5/10/2018     Primary Care Physician   AppleMartín Felix [phone]    Assessment & Plan   Tian Espitia is a 86 year old male with PMH significant for Alzheimer's disease, Bipolar, CKD stage III, depression, GERD, hypothyroidism , hx of TIA who now presents with cough and hx of aspiration pneumonia.      Pneumonitis due to inhalation of food or vomitus (H)    Leukocytosis  Presents with cough after questionable choking episode and nausea.  VS reveal mild tachycardia.  KARLIE as below.  WBC 13.5.  EKG with left bundle branch block.  Unclear if this is new; no cardiac complaints.  CXR without infiltrate.  Given 500cc NS in ED.  Started on Unasyn.  - order UA/UC  - continue Unasyn Q6 hours  - consider repeat imaging  - IP speech eval ordered; NPO until evaluation  - consider palliative care IP consult    KARLIE (acute kidney injury) (H)  Chronic kidney disease, unspecified CKD stage  Creatinine 1.0 - 1.3. 1.5 on admission  - continue IVF  - BMP in AM    Alzheimer's disease  Bipolar disorder (H)  Depressive disorder  Severe dementia at baseline.  - continue PTA Effexor      Transient cerebral ischemia  - continue PTA aspirin      Hypothyroidism, unspecified type  - continue PTA levothyroxine      Gastroesophageal reflux disease without esophagitis  Not on medication       F: 50cc/hr of 0.9NS  E: BMP in AM  N: NPO pending speech swallow eval  DVT Prophylaxis: mechanical    Code Status: DNR / DNI    Disposition: Anticipate discharge in 2-3 days. Appropriate for inpatient care.    Case discussed with Dr. Alexandro Banks.  Assessment and plan as written above.    Mindi Fox PA-C  [facility] Hospitalist Program    History is obtained from the patient and review of the EMR.    Past Medical History    Past Medical History:   Diagnosis Date     Alzheimer disease      Bipolar disorder  "(H)      CKD (chronic kidney disease)      Depressive disorder      Hyperlipemia      Mental disorder      Paralysis agitans (H)        Past Surgical History   Past Surgical History:   Procedure Laterality Date     HERNIA REPAIR       IR GASTRO JEJUNOSTOMY TUBE PLACEMENT  01/04/2008       Family History    Family History   Problem Relation Age of Onset     CEREBROVASCULAR DISEASE Mother        History of Present Illness   Tian Espitia is a 86 year old male with PMH significant for Alzheimer's disease, Bipolar, CKD stage III, depression, GERD, hypothyroidism , hx of TIA who now presents with cough and hx of aspiration pneumonia.    Patient is unable to provide any history due to baseline dementia.  Per the patient's wife, he was in his regular state of health this morning.  [facility].  He had a slight cough at that point in time.  They received a call the evening stating he wasn't doing \"well\".  They were also told that he had one episode of nausea.    ROS: unable    Prior to Admission Medications   Prior to Admission Medications   Prescriptions Last Dose Informant Patient Reported? Taking?   ACETAMINOPHEN PO 5/10/2018 at am  Yes Yes   Sig: Take 1,000 mg by mouth 2 times daily   ASPIRIN PO 5/9/2018 at hs  Yes Yes   Sig: Take 81 mg by mouth At Bedtime   LEVOTHYROXINE SODIUM PO 5/10/2018 at 0730  Yes Yes   Sig: Take 88 mcg by mouth daily   fluticasone (FLONASE) 50 MCG/ACT spray 5/10/2018 at am  Yes Yes   Sig: Spray 1 spray into both nostrils daily   hypromellose (ARTIFICIAL TEARS) 0.4 % SOLN ophthalmic solution Unknown at prn  Yes No   Sig: Place 1 drop into both eyes 4 times daily as needed for dry eyes   polyethylene glycol (MIRALAX/GLYCOLAX) powder 5/10/2018 at am  Yes Yes   Sig: Take 17 g by mouth daily   sennosides (SENOKOT) 8.6 MG tablet Unknown at prn  Yes No   Sig: Take 2 tablets by mouth 2 times daily as needed for constipation   venlafaxine (EFFEXOR-XR) 75 MG 24 hr capsule 5/10/2018 " at am  Yes Yes   Sig: Take 75 mg by mouth daily      Facility-Administered Medications: None       Allergies   No Known Allergies    Social History   Social History     Social History     Marital status:      Spouse name: N/A     Number of children: N/A     Years of education: N/A     Occupational History     Not on file.     Social History Main Topics     Smoking status: Never Smoker     Smokeless tobacco: Never Used     Alcohol use No     Drug use: No     Sexual activity: No     Other Topics Concern     Not on file     Social History Narrative     No narrative on file       Physical Exam     BP (!) 178/105  Temp 99  F (37.2  C) (Oral)  Resp 18  SpO2 96%     Weight: 0 lbs 0 oz There is no height or weight on file to calculate BMI.     # Pain Assessment:   Tian chase pain level was assessed and he currently denies pain.        Constitutional: Alert and oriented x0.  Uncooperative.  Appears stated age.  Appears in no acute distress.   HEENT: Oropharynx is clear and moist. No evidence of cranial trauma.  Lymph/Hematologic: No occipital, submental, submandibular, anterior or posterior cervical, or supraclavicular lymphadenopathy is appreciated.  Cardiovascular: Regular rate/rhythm.  S1 and S2 grossly normal.  No appreciable murmur, rub, gallop.  No lower extremity edema.  Respiratory: Clear to auscultation bilaterally. Equal chest expansion.  GI: Soft, non-tender, normal bowel sounds, no hepatosplenomegaly.  Genitourinary: Deferred  Musculoskeletal: Normal muscle bulk and tone.  Skin: Warm and dry, no rashes.   Neurologic: Neck supple. Cranial nerves 3-12 are grossly intact.  is symmetric.     Data   Data reviewed today:     Recent Labs  Lab 05/10/18  1913   WBC 13.6*   HGB 14.6   MCV 88         POTASSIUM 4.4   CHLORIDE 114*   CO2 24   BUN 37*   CR 1.50*   ANIONGAP 6   TONYA 8.9   *   ALBUMIN 3.8   PROTTOTAL 7.5   BILITOTAL 0.2   ALKPHOS 66   ALT 34   AST 22   TROPI <0.015       Recent  Results (from the past 24 hour(s))   Chest XR,  PA & LAT    Narrative    CHEST TWO VIEWS   5/10/2018 8:49 PM     COMPARISON: None.    HISTORY: Cough.     FINDINGS: The cardiac silhouette, pulmonary vasculature, lungs and  pleural spaces are within normal limits.      Impression    IMPRESSION: Clear lungs.       I personally reviewed the EKG tracing showing left bundle branch.    Mindi Fox PA-C  [facility] Medicine

## 2018-05-11 NOTE — PROGRESS NOTES
Bedside swallow evaluation completed.  Full note to follow.  Diet recommendation is dysphagia diet level 1 (pureed) with liquids thickened to nectar consistency.

## 2018-05-12 NOTE — DISCHARGE SUMMARY
[facility] Discharge Summary    Tian Espitia MRN# [id number]   Age: 86 year old YOB: 1932     Date of Admission:  5/10/2018  Date of Discharge::  5/12/2018  Admitting Physician:  Alexandro Banks MD  Discharge Physician:  Alexandro Banks MD, MD             Admission Diagnoses:   Aspiration pneumonia due to inhalation of vomitus (H) [J69.0]          Principle Discharge Diagnosis:     Pneumonitis due to inhalation of food or vomitus (H)      See hospital course for further active diagnoses addressed during this admission.            Procedures:   No procedures performed during this admission          Medications Prior to Admission:     No prescriptions prior to admission.             Discharge Medications:     Discharge Medication List as of 5/11/2018 11:38 AM      START taking these medications    Details   amoxicillin-clavulanate (AUGMENTIN) 500-125 MG per tablet Take 1 tablet by mouth 2 times daily for 5 days, Disp-20 tablet, R-0, Transitional         CONTINUE these medications which have NOT CHANGED    Details   ACETAMINOPHEN PO Take 1,000 mg by mouth 2 times daily, Historical      ASPIRIN PO Take 81 mg by mouth At Bedtime, Historical      fluticasone (FLONASE) 50 MCG/ACT spray Spray 1 spray into both nostrils daily, Historical      LEVOTHYROXINE SODIUM PO Take 88 mcg by mouth daily, Historical      polyethylene glycol (MIRALAX/GLYCOLAX) powder Take 17 g by mouth daily, Historical      venlafaxine (EFFEXOR-XR) 75 MG 24 hr capsule Take 75 mg by mouth daily, Historical      hypromellose (ARTIFICIAL TEARS) 0.4 % SOLN ophthalmic solution Place 1 drop into both eyes 4 times daily as needed for dry eyes, Historical      sennosides (SENOKOT) 8.6 MG tablet Take 2 tablets by mouth 2 times daily as needed for constipation, Historical                   Consultations:   No consultations were requested during this admission          Brief History of Illness:     From Admission H+P:   Tian Espitia is a 86  "year old male with PMH significant for Alzheimer's disease, Bipolar, CKD stage III, depression, GERD, hypothyroidism , hx of TIA who now presents with cough and hx of aspiration pneumonia.     Patient is unable to provide any history due to baseline dementia.  Per the patient's wife, he was in his regular state of health this morning.  She went to [facility] and fed him lunch.  He had a slight cough at that point in time.  They received a call the evening stating he wasn't doing \"well\".  They were also told that he had one episode of nausea.               TODAY:     See prog note         Hospital Course:     Tian Espitia is a 86 year old male with PMH significant for Alzheimer's disease, Bipolar, CKD stage III, depression, GERD, hypothyroidism , hx of TIA who now presents with cough and hx of aspiration pneumonia.          Pneumonitis due to inhalation of food or vomitus (H)    5/10/18 -- Presents with cough after questionable choking episode and nausea.  VS reveal mild tachycardia.  KARLIE as below.  WBC 13.5.  EKG with left bundle branch block.  Unclear if this is new; no cardiac complaints.  CXR without infiltrate.  Given 500cc NS in ED.  Started on Unasyn.  5/11/2018 --UA negative.  Chest x-ray remains negative overall looks like an aspiration pneumonitis with no clear pneumonia present.  Patient appears near baseline.  Will discharge with 5 more days of augmentin and plan for dysphagia diet level 1/nectar as below.        Dysphagia   5/11/2018 -- per speech evaluation today recommended pureed solids and nectar consistency liquids.   (Dysphagia diet level 1/nectar) - will plan for this on discharge.       KARLIE (acute kidney injury) (H) with underlying Chronic kidney disease, unspecified CKD stage  5/10/18 -- Creatinine 1.0 - 1.3. 1.5 on admission, continue IVF  5/11/2018 -- back to baseline.        Alzheimer's disease  Bipolar disorder (H)  Depressive disorder  5/10/18 -- Severe dementia at baseline.  - continue PTA " Effexor  5/11/2018 -- appears baseline.         Transient cerebral ischemia  - continue PTA aspirin      Hypothyroidism, unspecified type  - continue PTA levothyroxine      Gastroesophageal reflux disease without esophagitis  Not on medication     Code Status: DNR / DNI     Disposition  Discharge back to LTC.               Discharge Instructions and Follow-Up:   Discharge diet: Nectar thickened   Discharge activity: Activity as tolerated   Discharge follow-up: Follow up with primary care provider in 7 days           Discharge Disposition:   Back to skilled care      Attestation:  I have reviewed today's vital signs, notes, medications, labs and imaging.  Amount of time performed on this discharge summary: 40 minutes.    Alexandro Banks MD, MD

## 2018-05-21 PROBLEM — R33.9 URINE RETENTION: Status: ACTIVE | Noted: 2018-01-01

## 2018-05-21 NOTE — PROGRESS NOTES
Dear Dr. Derick Long,    Your patient, Tian Espitia, is an enrollee of [facility], a product of [employer]. I am the Care Coordinator assigned to work with this member.      My role is to facilitate communication and coordinate care across provider types and settings, assist with the transition between settings and maximize member self-determination when choosing services, providers and living arrangements.    I have reviewed the nursing home plan of care and discussed with your patient information including benefits, advanced directives, transitions of care, and return to community options, if applicable.  If you would like a copy of my full assessment, you may contact me directly.      Our [facility] Medical Director, Dr. Lacho Ray, is also a resource to you as you provide for the health needs of our members. He can be reached at [phone] or [email or web address].      Please feel free to contact either one of us for assistance as we work towards the health and well being of our members.    Sincerely,    Martín Chiu  Care Coordinator  [phone]

## 2018-05-21 NOTE — PROGRESS NOTES
[facility]  PRIMARY CARE PROVIDER AND CLINIC:  Martín Chiu [address] [facility] [address] / [facility] [address]  Chief Complaint   Patient presents with     Hospital F/U     [facility] Medical Record Number:  [id number]    HPI:    Tian Espitia is a 86 year old  (1/20/1932),admitted to the [facility] from [facility].  Hospital stay 5/10/18  through 5/12/18.  Admitted to this facility for  rehab, medical management, nursing care and long term care.  HPI information obtained from: facility chart records, facility staff, patient report and [facility] chart review.  Current issues are:         Pneumonitis due to inhalation of food or vomitus (H)  Urine retention  Bipolar disorder in partial remission, most recent episode unspecified type (H)     Patient seen today, ate breakfast, diet mechanical, per staff report since return slightly lethargic, processing slower, limited response, increased sleeping, had urine retention 5/19 and required straight cath, presents stable today with no acute concerns.        Hospital Course:      Tian Espitia is a 86 year old male with PMH significant for Alzheimer's disease, Bipolar, CKD stage III, depression, GERD, hypothyroidism , hx of TIA who now presents with cough and hx of aspiration pneumonia.          Pneumonitis due to inhalation of food or vomitus (H)    5/10/18 -- Presents with cough after questionable choking episode and nausea.  VS reveal mild tachycardia.  KARLIE as below.  WBC 13.5.  EKG with left bundle branch block.  Unclear if this is new; no cardiac complaints.  CXR without infiltrate.  Given 500cc NS in ED.  Started on Unasyn.  5/11/2018 --UA negative.  Chest x-ray remains negative overall looks like an aspiration pneumonitis with no clear pneumonia present.  Patient appears near baseline.  Will discharge with 5 more days of augmentin and plan for dysphagia diet level 1/nectar as below.        Dysphagia    5/11/2018 -- per speech evaluation today recommended pureed solids and nectar consistency liquids.   (Dysphagia diet level 1/nectar) - will plan for this on discharge.        KARLIE (acute kidney injury) (H) with underlying Chronic kidney disease, unspecified CKD stage  5/10/18 -- Creatinine 1.0 - 1.3. 1.5 on admission, continue IVF  5/11/2018 -- back to baseline.        Alzheimer's disease  Bipolar disorder (H)  Depressive disorder  5/10/18 -- Severe dementia at baseline.  - continue PTA Effexor  5/11/2018 -- appears baseline.         Transient cerebral ischemia  - continue PTA aspirin      Hypothyroidism, unspecified type  - continue PTA levothyroxine      Gastroesophageal reflux disease without esophagitis  Not on medication      Code Status: DNR / DNI      Disposition  Discharge back to LTC.      CODE STATUS/ADVANCE DIRECTIVES DISCUSSION:   DNR / DNI  Patient's living condition: lives in a nursing home    ALLERGIES:Review of patient's allergies indicates no known allergies.  PAST MEDICAL HISTORY:  has a past medical history of Alzheimer disease; Bipolar disorder (H); CKD (chronic kidney disease); Depressive disorder; Hyperlipemia; Mental disorder; and Paralysis agitans (H).  PAST SURGICAL HISTORY:  has a past surgical history that includes IR Gastro Jejunostomy Tube Placement (01/04/2008) and hernia repair.  FAMILY HISTORY: family history includes CEREBROVASCULAR DISEASE in his mother.  SOCIAL HISTORY:  reports that he has never smoked. He has never used smokeless tobacco. He reports that he does not drink alcohol or use illicit drugs.    Post Discharge Medication Reconciliation Status: discharge medications reconciled, continue medications without change.  Current Outpatient Prescriptions   Medication Sig Dispense Refill     ACETAMINOPHEN PO Take 1,000 mg by mouth 2 times daily       ACIDOPHILUS LACTOBACILLUS PO Give 1 tablet by mouth two times a  day for Antibiotic Use until  05/31/2018       ASPIRIN PO Take 81  mg by mouth At Bedtime       fluticasone (FLONASE) 50 MCG/ACT spray Spray 1 spray into both nostrils daily       hypromellose (ARTIFICIAL TEARS) 0.4 % SOLN ophthalmic solution Place 1 drop into both eyes 4 times daily as needed for dry eyes       LEVOTHYROXINE SODIUM PO Take 88 mcg by mouth daily       polyethylene glycol (MIRALAX/GLYCOLAX) powder Take 17 g by mouth daily       sennosides (SENOKOT) 8.6 MG tablet Take 2 tablets by mouth 2 times daily as needed for constipation       venlafaxine (EFFEXOR-XR) 75 MG 24 hr capsule Take 75 mg by mouth daily         ROS:  Unobtainable secondary to cognitive impairment.     Exam:  /75  Pulse 107  Temp 96.8  F (36  C)  Resp 18  Wt 150 lb 1.6 oz (68.1 kg)  SpO2 95%  BMI 22.82 kg/m2  GENERAL APPEARANCE:  in no distress, thin  ENT:  Mouth and posterior oropharynx normal, moist mucous membranes  RESP:  lungs clear to auscultation   CV:  regular rate and rhythm, no murmur, rub, or gallop, no edema  ABDOMEN:  bowel sounds normal  M/S:   Gait and station abnormal walks with assist, feeding self  SKIN:  Inspection of skin and subcutaneous tissue baseline  NEURO:   Examination of sensation by touch normal  PSYCH:  oriented to self, memory impaired     Lab/Diagnostic data:     CBC RESULTS:   Recent Labs   Lab Test  05/11/18   0558  05/10/18   1913   WBC  13.8*  13.6*   RBC  4.33*  4.97   HGB  12.7*  14.6   HCT  38.4*  43.7   MCV  89  88   MCH  29.3  29.4   MCHC  33.1  33.4   RDW  13.2  13.6   PLT  173  204       Last Basic Metabolic Panel:  Recent Labs   Lab Test  05/11/18   0558  05/10/18   1913   NA  145*  144   POTASSIUM  4.1  4.4   CHLORIDE  116*  114*   TONYA  8.4*  8.9   CO2  23  24   BUN  30  37*   CR  1.20  1.50*   GLC  114*  126*       Liver Function Studies -   Recent Labs   Lab Test  05/10/18   1913   PROTTOTAL  7.5   ALBUMIN  3.8   BILITOTAL  0.2   ALKPHOS  66   AST  22   ALT  34       TSH   Date Value Ref Range Status   03/26/2018 0.78 0.30 - 5.00 uIU/mL Final    ]    No results found for: A1C    ASSESSMENT/PLAN:  Pneumonitis due to inhalation of food or vomitus (H)  -today pulmonary fields are clear, eating/drinking easily  -changed diet to mechanical  -continue augmentin 500/125 BID x5days  -staff to monitor mealtimes/VS    Urine retention  -on 5/19 had retention, was straight cathed  -unsure if this is normal decline or based on recent hospitalization status  -ordered bladder scans Qshift, SC for PVR >300  -if continues to have urine output issues will refer to urology    Bipolar disorder in partial remission, most recent episode unspecified type (H)  -patient has been stable since wean/DC of depakote in April of 2018  -continue effexor for now  -facility to follow, if having s/s bipolar will consider restarting depakote         Electronically signed by:  CELSA Crowell CNP

## 2018-05-30 PROBLEM — Z51.5 HOSPICE CARE PATIENT: Status: ACTIVE | Noted: 2018-01-01

## 2018-05-30 NOTE — PROGRESS NOTES
[facility]    Chief Complaint   Patient presents with     halfway Acute       [facility] Medical Record Number:  [id number]    HPI:    Tian Espitia is a 86 year old  (1/20/1932), who is being seen today for an episodic care visit at [facility].  HPI information obtained from: facility chart records, facility staff, patient report and [facility] chart review.Today's concern is:     Chronic congestive heart failure, unspecified congestive heart failure type (H)  Depressive disorder  Urine retention  Hospice care patient     Patient seen today for follow up, from 5/21-25 patient was starting to experience overall decline, labs checked 5/25, requires catheterization due to urine retention, considering CHF exacerbation based on symptoms, of note BNP was 248, fingers and toes were blue, now seen in room, appears to have stabilized, no s/s distress, no SOB, pleasant word-salad responses, watching TV, no acute concerns, was started on hospice.    ALLERGIES: Review of patient's allergies indicates no known allergies.  Past Medical, Surgical, Family and Social History reviewed and updated in [facility].    Current Outpatient Prescriptions   Medication Sig Dispense Refill     ACETAMINOPHEN PO Take 1,000 mg by mouth 2 times daily       ACIDOPHILUS LACTOBACILLUS PO Give 1 tablet by mouth two times a  day for Antibiotic Use until  05/31/2018       ASPIRIN PO Take 81 mg by mouth At Bedtime       fluticasone (FLONASE) 50 MCG/ACT spray Spray 1 spray into both nostrils daily       hypromellose (ARTIFICIAL TEARS) 0.4 % SOLN ophthalmic solution Place 1 drop into both eyes 4 times daily as needed for dry eyes       LEVOTHYROXINE SODIUM PO Take 88 mcg by mouth daily       polyethylene glycol (MIRALAX/GLYCOLAX) powder Take 17 g by mouth daily       sennosides (SENOKOT) 8.6 MG tablet Take 2 tablets by mouth 2 times daily as needed for constipation       venlafaxine (EFFEXOR-XR) 75 MG 24 hr capsule Take 75 mg by  mouth daily       Medications reviewed:  Medications reconciled to facility chart and changes were made to reflect current medications as identified as above med list. Below are the changes that were made:   Medications stopped since last EPIC medication reconciliation:   There are no discontinued medications.    Medications started since last [facility] medication reconciliation:  No orders of the defined types were placed in this encounter.      REVIEW OF SYSTEMS:  Unobtainable secondary to cognitive impairment.     Physical Exam:  /80  Pulse 60  Temp 96.3  F (35.7  C)  Resp 17  Wt 143 lb (64.9 kg)  SpO2 95%  BMI 21.74 kg/m2  GENERAL APPEARANCE:  in no distress, thin  ENT:  Mouth and posterior oropharynx normal, moist mucous membranes  RESP:  lungs clear to auscultation , no respiratory distress  CV:  Palpation and auscultation of heart done , no edema, irregular rhythm rate  ABDOMEN:  bowel sounds normal  M/S:   Gait and station abnormal requires broda chair, assist all ADL's  SKIN:  Inspection of skin and subcutaneous tissue baseline  NEURO:   Examination of sensation by touch normal  PSYCH:  oriented to unknown    Recent Labs:     CBC RESULTS:   Recent Labs   Lab Test  05/11/18   0558  05/10/18   1913   WBC  13.8*  13.6*   RBC  4.33*  4.97   HGB  12.7*  14.6   HCT  38.4*  43.7   MCV  89  88   MCH  29.3  29.4   MCHC  33.1  33.4   RDW  13.2  13.6   PLT  173  204       Last Basic Metabolic Panel:  Recent Labs   Lab Test  05/11/18   0558  05/10/18   1913   NA  145*  144   POTASSIUM  4.1  4.4   CHLORIDE  116*  114*   TONYA  8.4*  8.9   CO2  23  24   BUN  30  37*   CR  1.20  1.50*   GLC  114*  126*       Liver Function Studies -   Recent Labs   Lab Test  05/10/18   1913   PROTTOTAL  7.5   ALBUMIN  3.8   BILITOTAL  0.2   ALKPHOS  66   AST  22   ALT  34       TSH   Date Value Ref Range Status   03/26/2018 0.78 0.30 - 5.00 uIU/mL Final   ]    No results found for: A1C      Assessment/Plan:  Chronic congestive heart  failure, unspecified congestive heart failure type (H)  -thinking that CHF mild exacerbation was cause of recent decline, has since slightly rebounded  -BNP on 5/25 was 248  -staff to aid with nutrition/fluid intake as possible    Depressive disorder  -chronic history, is stable, would prefer to start weaning venlafaxine but will wait  -continue venalafaxine until status change then review    Urine retention  -was having retention, straight cathed at least once  -continue bladder scans Qshift with SC if >300mL    Hospice care patient  -due to decline hospice contacted, family agreed  -started with [facility] on 5/25/18; appreciate their support  -current medications appropriate  -hospice staff to follow weekly/PRN        Electronically signed by  CELSA Crowell CNP

## 2018-06-05 NOTE — PROGRESS NOTES
[place] [facility]    Chief Complaint   Patient presents with     long term Regulatory       [facility] Medical Record Number:  [id number]    HPI:    Tian Espitia is a 86 year old  (1/20/1932), who is being seen today for a federally mandated E/M visit at [facility].  HPI information obtained from: facility staff and [facility] Epic chart review.     Today's concerns are:  - Resident seen and examined. Admitted to [facility] from 5/10 until 5/12 for aspiration pna treated wit Augmentin. Seen by SLP started on DDI. Hospitalization c/w KARLIE resolved,   - developed urinary retention in May, started on straight cath. RN reports that has been doing urinary scanning and less than 20 ml for the most part, and asking if it can arpit stopped.    -  Enrolled in hospice on 5/25/18  - Reportedly sleep, appetite and BM are fine.     ----------------------------------------------------------------  - - Past Medical, social, family histories, medications, and allergies reviewed and updated  - Medications reviewed: in the chart and EHR.   - Case Management:   I have reviewed the care plan and MDS and do agree with the plan. Patient's desire to return to the community is not present.  Information reviewed:  Medications, vital signs, orders, and nursing notes.    MEDICATIONS:  Current Outpatient Prescriptions   Medication Sig Dispense Refill     ACETAMINOPHEN PO Take 1,000 mg by mouth 2 times daily       bisacodyl (DULCOLAX) 10 MG Suppository Place 10 mg rectally daily as needed for constipation       fluticasone (FLONASE) 50 MCG/ACT spray Spray 1 spray into both nostrils daily       hyoscyamine (ANASPAZ/LEVSIN) 0.125 MG tablet Take 0.125 mg by mouth every 4 hours as needed for cramping       hypromellose (ARTIFICIAL TEARS) 0.4 % SOLN ophthalmic solution Place 1 drop into both eyes 4 times daily as needed for dry eyes       LEVOTHYROXINE SODIUM PO Take 75 mcg by mouth daily        morphine sulfate HIGH CONCENTRATE  (ROXANOL) 20 mg/mL (HIGH CONC) solution Place 5 mg under the tongue every hour as needed for shortness of breath / dyspnea or moderate to severe pain       polyethylene glycol (MIRALAX/GLYCOLAX) powder Take 17 g by mouth daily       sennosides (SENOKOT) 8.6 MG tablet Take 2 tablets by mouth 2 times daily as needed for constipation       venlafaxine (EFFEXOR-XR) 75 MG 24 hr capsule Take 75 mg by mouth daily     Medications started since last [facility] medication reconciliation:     Medications     bisacodyl (DULCOLAX) 10 MG Suppository     Sig: Place 10 mg rectally daily as needed for constipation     hyoscyamine (ANASPAZ/LEVSIN) 0.125 MG tablet     Sig: Take 0.125 mg by mouth every 4 hours as needed for cramping     morphine sulfate HIGH CONCENTRATE (ROXANOL) 20 mg/mL (HIGH CONC) solution     Sig: Place 5 mg under the tongue every hour as needed for shortness of breath / dyspnea or moderate to severe pain     ROS:  Unobtainable secondary to cognitive impairment.     Exam:  Vitals: /63  Pulse 81  Temp 97.6  F (36.4  C)  Resp 15  Wt 143 lb (64.9 kg)  SpO2 98%  BMI 21.74 kg/m2  BMI= Body mass index is 21.74 kg/(m^2).  GENERAL APPEARANCE:  in no distress, cooperative  RESP:  respiratory effort and palpation of chest normal, lungs clear to auscultation , no respiratory distress  CV:  Palpation and auscultation of heart done , regular rate and rhythm, no murmur, rub, or gallop  ABDOMEN:  normal bowel sounds, soft, nontender, no hepatosplenomegaly or other masses  M/S:   Gait and station abnormal uses WC  SKIN:  Inspection of skin and subcutaneous tissue baseline, Palpation of skin and subcutaneous tissue baseline  NEURO:   no NFD appreciated on observation  PSYCH:  oriented to self, otherwise pleasantly confused.     Lab/Diagnostic data:   CBC RESULTS:   Recent Labs   Lab Test  05/11/18   0558  05/10/18   1913   WBC  13.8*  13.6*   RBC  4.33*  4.97   HGB  12.7*  14.6   HCT  38.4*  43.7   MCV  89  88   MCH  29.3   29.4   MCHC  33.1  33.4   RDW  13.2  13.6   PLT  173  204   Last Basic Metabolic Panel:  Recent Labs   Lab Test  05/11/18   0558  05/10/18   1913   NA  145*  144   POTASSIUM  4.1  4.4   CHLORIDE  116*  114*   TONYA  8.4*  8.9   CO2  23  24   BUN  30  37*   CR  1.20  1.50*   GLC  114*  126*   Liver Function Studies -   Recent Labs   Lab Test  05/10/18   1913   PROTTOTAL  7.5   ALBUMIN  3.8   BILITOTAL  0.2   ALKPHOS  66   AST  22   ALT  34     ASSESSMENT/PLAN  Bipolar affective disorder, currently depressed, moderate (H)  Late onset Alzheimer'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  - stable on meds, continue  - Continue to anticipate needs. Chronic condition, ongoing decline expected.   -  Continue to provide redirection and reassurance as needed. Maintain safe living situation with goals focused on comfort.     Other dysphagia  Aspiration PNA  - SLP on the case,  No concern.   - will check SpO2% and if continued to be wnl on RA, will dc O2.      Hypothyroidism, unspecified type  03/26/2018 0.78 0.30 - 5.00 uIU/mL Final    - LT4 reduced from 100 mcg to 88 mcg. In hospice now, will reduce LT4 further to 75 mcg.     Urinary Retention:  - has UI now, and scanning showed less than 30 lm most of the time. Will dc it. Continue to monitor.      Gastroesophageal reflux disease without esophagitis  - off Ranitidine. No concern.       Other constipation  -no concern     Chronic kidney disease, stage 2  - - Avoid nephrotoxic drugs  - Renal dose the medications.     Adult Failure to Thrive  Hospice Care:  - Symptoms managed by [facility] GNP and Hospice Team.    Orders:  1.  D/c ASA. (hospice now)  2.  Reduce Levothyroxine to from 88 mcg to 75 mcg po QD.  Dx: hypothyroidism  3.  D/c bladder scan.  4.  Check Sp 02% Q shift x 3 days.  If average is > 94% stop 02 and Sp02% checks.    Electronically signed by:  Derick Long MD

## 2018-06-06 NOTE — LETTER
6/6/2018        RE: Tian Espitia  [address] [address]          [place] [facility]    Chief Complaint   Patient presents with     alf Regulatory       [facility] Medical Record Number:  [id number]    HPI:    Tian Espitia is a 86 year old  (1/20/1932), who is being seen today for a federally mandated E/M visit at [facility].  HPI information obtained from: facility staff and [facility] chart review.     Today's concerns are:  - Resident seen and examined. Admitted to [facility] from 5/10 until 5/12 for aspiration pna treated wit Augmentin. Seen by SLP started on DDI. Hospitalization c/w KARLIE resolved,   - developed urinary retention in May, started on straight cath. RN reports that has been doing urinary scanning and less than 20 ml for the most part, and asking if it can arpit stopped.    -  Enrolled in hospice on 5/25/18  - Reportedly sleep, appetite and BM are fine.     ----------------------------------------------------------------  - - Past Medical, social, family histories, medications, and allergies reviewed and updated  - Medications reviewed: in the chart and EHR.   - Case Management:   I have reviewed the care plan and MDS and do agree with the plan. Patient's desire to return to the community is not present.  Information reviewed:  Medications, vital signs, orders, and nursing notes.    MEDICATIONS:  Current Outpatient Prescriptions   Medication Sig Dispense Refill     ACETAMINOPHEN PO Take 1,000 mg by mouth 2 times daily       bisacodyl (DULCOLAX) 10 MG Suppository Place 10 mg rectally daily as needed for constipation       fluticasone (FLONASE) 50 MCG/ACT spray Spray 1 spray into both nostrils daily       hyoscyamine (ANASPAZ/LEVSIN) 0.125 MG tablet Take 0.125 mg by mouth every 4 hours as needed for cramping       hypromellose (ARTIFICIAL TEARS) 0.4 % SOLN ophthalmic solution Place 1 drop into both eyes 4 times daily as needed for dry eyes       LEVOTHYROXINE SODIUM PO  Take 75 mcg by mouth daily        morphine sulfate HIGH CONCENTRATE (ROXANOL) 20 mg/mL (HIGH CONC) solution Place 5 mg under the tongue every hour as needed for shortness of breath / dyspnea or moderate to severe pain       polyethylene glycol (MIRALAX/GLYCOLAX) powder Take 17 g by mouth daily       sennosides (SENOKOT) 8.6 MG tablet Take 2 tablets by mouth 2 times daily as needed for constipation       venlafaxine (EFFEXOR-XR) 75 MG 24 hr capsule Take 75 mg by mouth daily     Medications started since last [facility] medication reconciliation:     Medications     bisacodyl (DULCOLAX) 10 MG Suppository     Sig: Place 10 mg rectally daily as needed for constipation     hyoscyamine (ANASPAZ/LEVSIN) 0.125 MG tablet     Sig: Take 0.125 mg by mouth every 4 hours as needed for cramping     morphine sulfate HIGH CONCENTRATE (ROXANOL) 20 mg/mL (HIGH CONC) solution     Sig: Place 5 mg under the tongue every hour as needed for shortness of breath / dyspnea or moderate to severe pain     ROS:  Unobtainable secondary to cognitive impairment.     Exam:  Vitals: /63  Pulse 81  Temp 97.6  F (36.4  C)  Resp 15  Wt 143 lb (64.9 kg)  SpO2 98%  BMI 21.74 kg/m2  BMI= Body mass index is 21.74 kg/(m^2).  GENERAL APPEARANCE:  in no distress, cooperative  RESP:  respiratory effort and palpation of chest normal, lungs clear to auscultation , no respiratory distress  CV:  Palpation and auscultation of heart done , regular rate and rhythm, no murmur, rub, or gallop  ABDOMEN:  normal bowel sounds, soft, nontender, no hepatosplenomegaly or other masses  M/S:   Gait and station abnormal uses WC  SKIN:  Inspection of skin and subcutaneous tissue baseline, Palpation of skin and subcutaneous tissue baseline  NEURO:   no  NFD appreciated on observation  PSYCH:  oriented to self, otherwise pleasantly confused.     Lab/Diagnostic data:   CBC RESULTS:   Recent Labs   Lab Test  05/11/18   0558  05/10/18   1913   WBC  13.8*  13.6*   RBC  4.33*   4.97   HGB  12.7*  14.6   HCT  38.4*  43.7   MCV  89  88   MCH  29.3  29.4   MCHC  33.1  33.4   RDW  13.2  13.6   PLT  173  204   Last Basic Metabolic Panel:  Recent Labs   Lab Test  05/11/18   0558  05/10/18   1913   NA  145*  144   POTASSIUM  4.1  4.4   CHLORIDE  116*  114*   TONYA  8.4*  8.9   CO2  23  24   BUN  30  37*   CR  1.20  1.50*   GLC  114*  126*   Liver Function Studies -   Recent Labs   Lab Test  05/10/18   1913   PROTTOTAL  7.5   ALBUMIN  3.8   BILITOTAL  0.2   ALKPHOS  66   AST  22   ALT  34     ASSESSMENT/PLAN  Bipolar affective disorder, currently depressed, moderate (H)  Late onset Alzheimer'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  - stable on meds, continue  - Continue to anticipate needs. Chronic condition, ongoing decline expected.   -  Continue to provide redirection and reassurance as needed. Maintain safe living situation with goals focused on comfort.     Other dysphagia  Aspiration PNA  - SLP on the case,  No concern.   - will check SpO2% and if continued to be wnl on RA, will dc O2.      Hypothyroidism, unspecified type  03/26/2018 0.78 0.30 - 5.00 uIU/mL Final    - LT4 reduced from 100 mcg to 88 mcg. In hospice now, will reduce LT4 further to 75 mcg.     Urinary Retention:  - has UI now, and scanning showed less than 30 lm most of the time. Will dc it. Continue to monitor.      Gastroesophageal reflux disease without esophagitis  - off Ranitidine. No concern.       Other constipation  -no concern     Chronic kidney disease, stage 2  - - Avoid nephrotoxic drugs  - Renal dose the medications.     Adult Failure to Thrive  Hospice Care:  - Symptoms managed by [facility] GNP and Hospice Team.    Orders:  1.  D/c ASA. (hospice now)  2.  Reduce Levothyroxine to from 88 mcg to 75 mcg po QD.  Dx: hypothyroidism  3.  D/c bladder scan.  4.  Check Sp 02% Q shift x 3 days.  If average is > 94% stop 02 and Sp02% checks.    Electronically signed by:  Derick Long  MD        Sincerely,        Derick Long MD

## 2018-07-24 NOTE — PROGRESS NOTES
[facility] [facility]    Chief Complaint   Patient presents with     Nursing Home Acute       HPI:    Tian Espitia is a 86 year old  (1/20/1932), who is being seen today for an episodic care visit at [facility].    HPI information obtained from: facility chart records, facility staff and patient report. 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  X-ray clear of any acute fractures or injuries however did indicate spinal compression and arthritis.  Also report increased combativeness which is new to him.  Temp of 100.12 nights ago.  No other symptoms related to that.  Decrease in eating and increase in sleeping.  As needed morphine was added to regime and patient has taken it 1 time per day over the last 2 days.  Chart also indicates significant weight loss however I question accuracy.  From 154-146 pounds in 2 days.  He has consistently been in the low to mid 150s.      REVIEW OF SYSTEMS:  Unobtainable secondary to cognitive impairment.     /68  Pulse 64  Temp 98.3  F (36.8  C)  Resp 15  Wt 146 lb 14.1 oz (66.6 kg)  SpO2 99%  BMI 22.33 kg/m2  GENERAL APPEARANCE:  Alert, in no distress  Patient alert sitting at lunch table in reclining wheelchair.  No signs of pain or distress.  Lungs clear.    ASSESSMENT/PLAN:  Chronic congestive heart failure, unspecified congestive heart failure type (H)  Late onset Alzheimer's disease without behavioral disturbance  Hospice care patient  Conference with [facility] hospice staff.  Will encourage long-term care facility staff to utilize as needed morphine for nonverbal signs of pain including increased combativeness or agitation.  Hospice team to follow and monitor. No other new orders at this time      Electronically signed by:  CELSA Bush CNP

## 2018-07-24 NOTE — LETTER
7/24/2018        RE: Tian Espitia  [address] [address]        [place] [facility]    Chief Complaint   Patient presents with     Nursing Home Acute       HPI:    Tian Espitia is a 86 year old  (1/20/1932), who is being seen today for an episodic care visit at [facility].    HPI information obtained from: facility chart records, facility staff and patient report. 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  X-ray clear of any acute fractures or injuries however did indicate spinal compression and arthritis.  Also report increased combativeness which is new to him.  Temp of 100.12 nights ago.  No other symptoms related to that.  Decrease in eating and increase in sleeping.  As needed morphine was added to regime and patient has taken it 1 time per day over the last 2 days.  Chart also indicates significant weight loss however I question accuracy.  From 154-146 pounds in 2 days.  He has consistently been in the low to mid 150s.      REVIEW OF SYSTEMS:  Unobtainable secondary to cognitive impairment.     /68  Pulse 64  Temp 98.3  F (36.8  C)  Resp 15  Wt 146 lb 14.1 oz (66.6 kg)  SpO2 99%  BMI 22.33 kg/m2  GENERAL APPEARANCE:  Alert, in no distress  Patient alert sitting at lunch table in reclining wheelchair.  No signs of pain or distress.  Lungs clear.    ASSESSMENT/PLAN:  Chronic congestive heart failure, unspecified congestive heart failure type (H)  Late onset Alzheimer's disease without behavioral disturbance  Hospice care patient  Conference with [facility] hospice staff.  Will encourage long-term care facility staff to utilize as needed morphine for nonverbal signs of pain including increased combativeness or agitation.  Hospice team to follow and monitor. No other new orders at this  time      Electronically signed by:  CELSA Bush CNP      Sincerely,        CELSA Bush CNP

## 2018-07-31 NOTE — LETTER
7/31/2018        RE: Tian Espitia  [address] [address]        [place] [facility]    Chief Complaint   Patient presents with     Nursing Home Acute       HPI:    Tian Espitia is a 86 year old  (1/20/1932), who is being seen today for an episodic care visit at [facility].    HPI information obtained from: facility chart records, facility staff and patient report. Today's concern is:  Personal history of fall  Patient had fall last week.  Staff indicates he is impulsive and gets up impulsively to self transfer for toileting.  No apparent injuries other than bruising on left hip.  Patient denies any pain.  Is on scheduled Tylenol and morphine.  Also has as needed morphine.  Has only used 1 dose in the past week.    Late onset Alzheimer's disease without behavioral disturbance  Exhibiting in impulsivity.  On Effexor but no other mood stabilizing medications.    Chronic congestive heart failure, unspecified congestive heart failure type (H)  Patient denies any chest pain or shortness of breath.  Is on oxygen continuously however frequently the nasal cannula are not directly in his nares.  Weight loss from 152-145 pounds in the past month.  Vital signs stable.  Blood pressure soft.  Patient denies any lightheadedness, headaches, dizziness.  BPs:  07/28/2018 12:59 122/64 mmHg (Sitting r/arm)  07/23/2018 10:59 119/68 mmHg  07/21/2018 13:43 127/69 mmHg  07/14/2018 13:49 124/68 mmHg  07/14/2018 09:44 124/64 mmHg (Sitting r/arm)  07/07/2018 13:19 127/68 mmHg      REVIEW OF SYSTEMS:  Limited secondary to cognitive impairment but today pt reports no concerns.    /64  Pulse 60  Temp 97.3  F (36.3  C)  Resp 16  Ht 6' (1.829 m)  Wt 145 lb 14.4 oz (66.2 kg)  SpO2 98%  BMI 19.79 kg/m2  GENERAL APPEARANCE:  Alert, in no distress, sitting in reclining wheelchair, dozing periodically.  RESP:  respiratory effort and palpation of chest normal, auscultation of lungs clear , no respiratory  distress  CV:  Palpation and auscultation of heart done , rate and rhythm regular, no murmur, no peripheral edema  ABDOMEN:  normal bowel sounds, soft, nontender, no hepatosplenomegaly or other masses  M/S:   Gait and station abnormal, wheelchair dependent.   SKIN: Resolving bruise left hip  NEURO:  NFD appreciated  PSYCH: Pleasantly confused.  Affect and mood normal      ASSESSMENT/PLAN:  Personal history of fall  Continue supervision and redirection with impulsivity.    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 Chronic condition, ongoing decline expected.   -  Continue to provide redirection and reassurance as needed. Maintain safe living situation with goals focused on comfort    Chronic congestive heart failure, unspecified congestive heart failure type (H)  Stable, compensated. The current medical regimen is effective;  continue present plan and medications.        Electronically signed by:  CELSA Bush CNP      Sincerely,        CELSA Bush CNP

## 2018-07-31 NOTE — PROGRESS NOTES
[place] [facility]    Chief Complaint   Patient presents with     Nursing Home Acute       HPI:    Tian Espitia is a 86 year old  (1/20/1932), who is being seen today for an episodic care visit at [facility].    HPI information obtained from: facility chart records, facility staff and patient report. Today's concern is:  Personal history of fall  Patient had fall last week.  Staff indicates he is impulsive and gets up impulsively to self transfer for toileting.  No apparent injuries other than bruising on left hip.  Patient denies any pain.  Is on scheduled Tylenol and morphine.  Also has as needed morphine.  Has only used 1 dose in the past week.    Late onset Alzheimer's disease without behavioral disturbance  Exhibiting in impulsivity.  On Effexor but no other mood stabilizing medications.    Chronic congestive heart failure, unspecified congestive heart failure type (H)  Patient denies any chest pain or shortness of breath.  Is on oxygen continuously however frequently the nasal cannula are not directly in his nares.  Weight loss from 152-145 pounds in the past month.  Vital signs stable.  Blood pressure soft.  Patient denies any lightheadedness, headaches, dizziness.  BPs:  07/28/2018 12:59 122/64 mmHg (Sitting r/arm)  07/23/2018 10:59 119/68 mmHg  07/21/2018 13:43 127/69 mmHg  07/14/2018 13:49 124/68 mmHg  07/14/2018 09:44 124/64 mmHg (Sitting r/arm)  07/07/2018 13:19 127/68 mmHg      REVIEW OF SYSTEMS:  Limited secondary to cognitive impairment but today pt reports no concerns.    /64  Pulse 60  Temp 97.3  F (36.3  C)  Resp 16  Ht 6' (1.829 m)  Wt 145 lb 14.4 oz (66.2 kg)  SpO2 98%  BMI 19.79 kg/m2  GENERAL APPEARANCE:  Alert, in no distress, sitting in reclining wheelchair, dozing periodically.  RESP:  respiratory effort and palpation of chest normal, auscultation of lungs clear , no respiratory distress  CV:  Palpation and auscultation of heart done , rate and rhythm  regular, no murmur, no peripheral edema  ABDOMEN:  normal bowel sounds, soft, nontender, no hepatosplenomegaly or other masses  M/S:   Gait and station abnormal, wheelchair dependent.   SKIN: Resolving bruise left hip  NEURO:  NFD appreciated  PSYCH: Pleasantly confused.  Affect and mood normal      ASSESSMENT/PLAN:  Personal history of fall  Continue supervision and redirection with impulsivity.    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 Chronic condition, ongoing decline expected.   -  Continue to provide redirection and reassurance as needed. Maintain safe living situation with goals focused on comfort    Chronic congestive heart failure, unspecified congestive heart failure type (H)  Stable, compensated. The current medical regimen is effective;  continue present plan and medications.        Electronically signed by:  CELSA Bush CNP

## 2018-08-06 NOTE — PROGRESS NOTES
[facility]  Chief Complaint   Patient presents with     Annual Comprehensive Nursing Home       [facility] Medical Record Number:  [id number]    HPI:    Tian Espitia is a 86 year old  (1/20/1932), who is being seen today for an annual comprehensive visit at [facility].  HPI information obtained from: facility chart records, facility staff and patient report.  Today's concerns are:  Patient enrolled in hospice end of May.  Staff indicates that he has been having some cognitive decline as well as physical decline.  This decline greatly exacerbates if he is without his oxygen.  Occasionally he gets agitated.  The staff has noted that this may be his indication of being in pain.  When morphine is administered he is able to relax.  He has had some episodes of impulsivity getting up out of his chair impulsively and has had some falls, thankfully without injury.  Staff has no concerns.  Patient's does not verbalize concerns however he does have dementia.      Based on JNC-8 goals,  patients age of 86 year old, no presence of diabetes or CKD, and goals of care goal BP is  <140/90 mm Hg. Patient is stable and continue without pharmacological invention with routine assessment..     ALLERGIES: Review of patient's allergies indicates no known allergies.  PROBLEM LIST:  Patient Active Problem List   Diagnosis     Alzheimer's disease     Pneumonitis due to inhalation of food or vomitus (H)     Bipolar disorder (H)     Depressive disorder     Rash and other nonspecific skin eruption     Transient cerebral ischemia     Hypothyroidism, unspecified type     Gastroesophageal reflux disease without esophagitis     Other dysphagia     Other constipation     Chronic kidney disease, unspecified CKD stage     KARLIE (acute kidney injury) (H)     Aspiration pneumonia (H)     Urine retention     Hospice care patient     PAST MEDICAL HISTORY:  has a past medical history of Alzheimer disease; Bipolar disorder (H); CKD  (chronic kidney disease); Depressive disorder; Hyperlipemia; Mental disorder; and Paralysis agitans (H).  PAST SURGICAL HISTORY:  has a past surgical history that includes IR Gastro Jejunostomy Tube Placement (01/04/2008) and hernia repair.  FAMILY HISTORY: family history includes Cerebrovascular Disease in his mother.  SOCIAL HISTORY:  reports that he has never smoked. He has never used smokeless tobacco. He reports that he does not drink alcohol or use illicit drugs.  IMMUNIZATIONS:  Most Recent Immunizations   Administered Date(s) Administered     FLU 6-35 months 09/20/2010     Flu, Unspecified 10/18/2016     Influenza (H1N1) 02/16/2010     Influenza (High Dose) 3 valent vaccine 09/11/2017     Influenza (IIV3) PF 10/14/2015     Pneumococcal 23 valent 01/11/2008     TD (ADULT, 7+) 08/22/2011     Td (Adult), Adsorbed 07/14/1999     Above immunizations pulled from [facility]. MIIC and facility records also reconciled. Outstanding information sent to  to update [employer]  Future immunizations are deferred as: not clinically appropriate given goals of care  MEDICATIONS:  Current Outpatient Prescriptions   Medication Sig Dispense Refill     acetaminophen (TYLENOL) 650 MG Suppository Place 650 mg rectally every 4 hours as needed for fever       ACETAMINOPHEN PO Take 1,000 mg by mouth 2 times daily       bisacodyl (DULCOLAX) 10 MG Suppository Place 10 mg rectally daily as needed for constipation       fluticasone (FLONASE) 50 MCG/ACT spray Spray 1 spray into both nostrils daily       hyoscyamine (ANASPAZ/LEVSIN) 0.125 MG tablet Take 0.125 mg by mouth every 4 hours as needed for cramping       hypromellose (ARTIFICIAL TEARS) 0.4 % SOLN ophthalmic solution Place 1 drop into both eyes 4 times daily as needed for dry eyes       LEVOTHYROXINE SODIUM PO Take 75 mcg by mouth daily        morphine sulfate HIGH CONCENTRATE (ROXANOL) 20 mg/mL (HIGH CONC) solution Place 5 mg under the tongue 4 times daily And  5mg SL every 1 hour PRN       polyethylene glycol (MIRALAX/GLYCOLAX) powder Take 17 g by mouth daily       sennosides (SENOKOT) 8.6 MG tablet Take 1 tablet by mouth 2 times daily And 2 tabs BID PRN       venlafaxine (EFFEXOR-XR) 75 MG 24 hr capsule Take 75 mg by mouth daily       Medications reviewed:  Medications reconciled to facility chart and changes were made to reflect current medications as identified as above med list. Below are the changes that were made:   Medications stopped since last EPIC medication reconciliation:   There are no discontinued medications.    Medications started since last [facility] medication reconciliation:  No orders of the defined types were placed in this encounter.      Case Management:  I have reviewed the facility/SNF care plan/MDS which was done 6-6-18, including the falls risk, nutrition and pain screening. I also reviewed the current immunizations, and preventive care..Future cancer screening is not clinically indicated secondary to age/goals of care Patient's desire to return to the community is not assessible due to cognitive impairment. Current Level of Care is appropriate.    Care Conference  The most recent care conference was done on 6-6-18 date and I have reviewed the notes.     Member/Responsible Party Interview:  Person answering below questions: patient/resident  1. What are the most important things to you? Unable to answer  2. What activities do you enjoy doing? Working with hands  3. Do you like where you live?(if no, explain what you would change) Yes   4. Would you like to live elsewhere? No    I communicated with patients contact wife, on the phone. I discussed the plan of care and gave my direct cell # call back if any further questions.    Advance Directive Discussion:    I reviewed the current advanced directives as reflected in EPIC, the POLST and the facility chart, and verified the congruency of orders DNR. I contacted the first party wife and discussed the  plan of Care.  I did not due to cognitive impairment review the advance directives with the resident.     Team Discussion:  I communicated with the appropriate disciplines involved with the Plan of Care:   Nursing   and       Patient Goal:  Patient's goal is unobtainable secondary to cognitive impairment.    Information reviewed:  Medications, vital signs, orders, and nursing notes.    ROS:  Limited secondary to cognitive impairment but today pt reports no concerns    Exam:  /64  Pulse 63  Temp 95.5  F (35.3  C)  Resp 14  Ht 6' (1.829 m)  Wt 146 lb 12.8 oz (66.6 kg)  SpO2 96%  BMI 19.91 kg/m2    GENERAL APPEARANCE:  in no distress, cooperative. Chronic runny nose  RESP:  lungs clear to auscultation , no respiratory distress  CV:  Palpation and auscultation of heart done , no edema, irregular rhythm intermittantly  ABDOMEN:  normal bowel sounds, soft, nontender, no hepatosplenomegaly or other masses  M/S:   Gait and station abnormal reclining w/c  NEURO:   NFD  PSYCH:  oriented to self, pleasantly confused     Lab/Diagnostic data:      CBC RESULTS:   Recent Labs   Lab Test  05/11/18   0558  05/10/18   1913   WBC  13.8*  13.6*   RBC  4.33*  4.97   HGB  12.7*  14.6   HCT  38.4*  43.7   MCV  89  88   MCH  29.3  29.4   MCHC  33.1  33.4   RDW  13.2  13.6   PLT  173  204       Last Basic Metabolic Panel:  Recent Labs   Lab Test  05/11/18   0558  05/10/18   1913   NA  145*  144   POTASSIUM  4.1  4.4   CHLORIDE  116*  114*   TONYA  8.4*  8.9   CO2  23  24   BUN  30  37*   CR  1.20  1.50*   GLC  114*  126*       Liver Function Studies -   Recent Labs   Lab Test  05/10/18   1913   PROTTOTAL  7.5   ALBUMIN  3.8   BILITOTAL  0.2   ALKPHOS  66   AST  22   ALT  34       TSH   Date Value Ref Range Status   03/26/2018 0.78 0.30 - 5.00 uIU/mL Final         ASSESSMENT/PLAN  Late onset Alzheimer's disease without behavioral disturbance  Bipolar disorder in partial remission, most recent episode unspecified type  (H)  Patient is doing well.  On Effexor extended release 75 mg daily no other psychotropic medications.  Continue with current plan.  Continue to anticipate needs.  Chronic cond levothyroxine dose recently decreased to 75 mcg ition, ongoing decline expected.  Hospice supportive services continue morphine  For pain and assess pain using nonverbal signal such as agitation.  Patient has had a weight loss from 155 in May to 146 currently    Hypothyroidism, unspecified type  Levothyroxine dose recently decreased to 75 mcg.  No indication for changes    Hospice care patient  Provide comfort.  Appreciate hospice staff involvement for extra services.      Orders:  No new orders    Electronically signed by:  CELSA Bush CNP

## 2018-08-07 NOTE — LETTER
8/7/2018        RE: Tian Espitia  [address] [place] [address]        [facility]  Chief Complaint   Patient presents with     Annual Comprehensive Nursing Home       [place] Medical Record Number:  [id number]    HPI:    Tian Espitia is a 86 year old  (1/20/1932), who is being seen today for an annual comprehensive visit at [facility].  HPI information obtained from: facility chart records, facility staff and patient report.  Today's concerns are:  Patient enrolled in hospice end of May.  Staff indicates that he has been having some cognitive decline as well as physical decline.  This decline greatly exacerbates if he is without his oxygen.  Occasionally he gets agitated.  The staff has noted that this may be his indication of being in pain.  When morphine is administered he is able to relax.  He has had some episodes of impulsivity getting up out of his chair impulsively and has had some falls, thankfully without injury.  Staff has no concerns.  Patient's does not verbalize concerns however he does have dementia.      Based on JNC-8 goals,  patients age of 86 year old, no presence of diabetes or CKD, and goals of care goal BP is  <140/90 mm Hg. Patient is stable and continue without pharmacological invention with routine assessment..     ALLERGIES: Review of patient's allergies indicates no known allergies.  PROBLEM LIST:  Patient Active Problem List   Diagnosis     Alzheimer's disease     Pneumonitis due to inhalation of food or vomitus (H)     Bipolar disorder (H)     Depressive disorder     Rash and other nonspecific skin eruption     Transient cerebral ischemia     Hypothyroidism, unspecified type     Gastroesophageal reflux disease without esophagitis     Other dysphagia     Other constipation     Chronic kidney disease, unspecified CKD stage     KARLIE (acute kidney injury) (H)     Aspiration pneumonia (H)     Urine retention     Hospice care patient     PAST MEDICAL HISTORY:  has  a past medical history of Alzheimer disease; Bipolar disorder (H); CKD (chronic kidney disease); Depressive disorder; Hyperlipemia; Mental disorder; and Paralysis agitans (H).  PAST SURGICAL HISTORY:  has a past surgical history that includes IR Gastro Jejunostomy Tube Placement (01/04/2008) and hernia repair.  FAMILY HISTORY: family history includes Cerebrovascular Disease in his mother.  SOCIAL HISTORY:  reports that he has never smoked. He has never used smokeless tobacco. He reports that he does not drink alcohol or use illicit drugs.  IMMUNIZATIONS:  Most Recent Immunizations   Administered Date(s) Administered     FLU 6-35 months 09/20/2010     Flu, Unspecified 10/18/2016     Influenza (H1N1) 02/16/2010     Influenza (High Dose) 3 valent vaccine 09/11/2017     Influenza (IIV3) PF 10/14/2015     Pneumococcal 23 valent 01/11/2008     TD (ADULT, 7+) 08/22/2011     Td (Adult), Adsorbed 07/14/1999     Above immunizations pulled from [facility]. MIIC and facility records also reconciled. Outstanding information sent to  to update [employer]  Future immunizations are deferred as: not clinically appropriate given goals of care  MEDICATIONS:  Current Outpatient Prescriptions   Medication Sig Dispense Refill     acetaminophen (TYLENOL) 650 MG Suppository Place 650 mg rectally every 4 hours as needed for fever       ACETAMINOPHEN PO Take 1,000 mg by mouth 2 times daily       bisacodyl (DULCOLAX) 10 MG Suppository Place 10 mg rectally daily as needed for constipation       fluticasone (FLONASE) 50 MCG/ACT spray Spray 1 spray into both nostrils daily       hyoscyamine (ANASPAZ/LEVSIN) 0.125 MG tablet Take 0.125 mg by mouth every 4 hours as needed for cramping       hypromellose (ARTIFICIAL TEARS) 0.4 % SOLN ophthalmic solution Place 1 drop into both eyes 4 times daily as needed for dry eyes       LEVOTHYROXINE SODIUM PO Take 75 mcg by mouth daily        morphine sulfate HIGH CONCENTRATE (ROXANOL) 20  mg/mL (HIGH CONC) solution Place 5 mg under the tongue 4 times daily And 5mg SL every 1 hour PRN       polyethylene glycol (MIRALAX/GLYCOLAX) powder Take 17 g by mouth daily       sennosides (SENOKOT) 8.6 MG tablet Take 1 tablet by mouth 2 times daily And 2 tabs BID PRN       venlafaxine (EFFEXOR-XR) 75 MG 24 hr capsule Take 75 mg by mouth daily       Medications reviewed:  Medications reconciled to facility chart and changes were made to reflect current medications as identified as above med list. Below are the changes that were made:   Medications stopped since last EPIC medication reconciliation:   There are no discontinued medications.    Medications started since last [facility] medication reconciliation:  No orders of the defined types were placed in this encounter.      Case Management:  I have reviewed the facility/SNF care plan/MDS which was done 6-6-18, including the falls risk, nutrition and pain screening. I also reviewed the current immunizations, and preventive care..Future cancer screening is not clinically indicated secondary to age/goals of care Patient's desire to return to the community is not assessible due to cognitive impairment. Current Level of Care is appropriate.    Care Conference  The most recent care conference was done on 6-6-18 date and I have reviewed the notes.     Member/Responsible Party Interview:  Person answering below questions: patient/resident  1. What are the most important things to you? Unable to answer  2. What activities do you enjoy doing? Working with hands  3. Do you like where you live?(if no, explain what you would change) Yes   4. Would you like to live elsewhere? No    I communicated with patients contact wife, on the phone. I discussed the plan of care and gave my direct cell # call back if any further questions.    Advance Directive Discussion:    I reviewed the current advanced directives as reflected in EPIC, the POLST and the facility chart, and verified the  congruency of orders DNR. I contacted the first party wife and discussed the plan of Care.  I did not due to cognitive impairment review the advance directives with the resident.     Team Discussion:  I communicated with the appropriate disciplines involved with the Plan of Care:   Nursing   and       Patient Goal:  Patient's goal is unobtainable secondary to cognitive impairment.    Information reviewed:  Medications, vital signs, orders, and nursing notes.    ROS:  Limited secondary to cognitive impairment but today pt reports no concerns    Exam:  /64  Pulse 63  Temp 95.5  F (35.3  C)  Resp 14  Ht 6' (1.829 m)  Wt 146 lb 12.8 oz (66.6 kg)  SpO2 96%  BMI 19.91 kg/m2    GENERAL APPEARANCE:  in no distress, cooperative. Chronic runny nose  RESP:  lungs clear to auscultation , no respiratory distress  CV:  Palpation and auscultation of heart done , no edema, irregular rhythm intermittantly  ABDOMEN:  normal bowel sounds, soft, nontender, no hepatosplenomegaly or other masses  M/S:   Gait and station abnormal reclining w/c  NEURO:   NFD  PSYCH:  oriented to self, pleasantly confused     Lab/Diagnostic data:      CBC RESULTS:   Recent Labs   Lab Test  05/11/18   0558  05/10/18   1913   WBC  13.8*  13.6*   RBC  4.33*  4.97   HGB  12.7*  14.6   HCT  38.4*  43.7   MCV  89  88   MCH  29.3  29.4   MCHC  33.1  33.4   RDW  13.2  13.6   PLT  173  204       Last Basic Metabolic Panel:  Recent Labs   Lab Test  05/11/18   0558  05/10/18   1913   NA  145*  144   POTASSIUM  4.1  4.4   CHLORIDE  116*  114*   TONYA  8.4*  8.9   CO2  23  24   BUN  30  37*   CR  1.20  1.50*   GLC  114*  126*       Liver Function Studies -   Recent Labs   Lab Test  05/10/18   1913   PROTTOTAL  7.5   ALBUMIN  3.8   BILITOTAL  0.2   ALKPHOS  66   AST  22   ALT  34       TSH   Date Value Ref Range Status   03/26/2018 0.78 0.30 - 5.00 uIU/mL Final         ASSESSMENT/PLAN  Late onset Alzheimer's disease without behavioral  disturbance  Bipolar disorder in partial remission, most recent episode unspecified type (H)  Patient is doing well.  On Effexor extended release 75 mg daily no other psychotropic medications.  Continue with current plan.  Continue to anticipate needs.  Chronic cond levothyroxine dose recently decreased to 75 mcg ition, ongoing decline expected.  Hospice supportive services continue morphine  For pain and assess pain using nonverbal signal such as agitation.  Patient has had a weight loss from 155 in May to 146 currently    Hypothyroidism, unspecified type  Levothyroxine dose recently decreased to 75 mcg.  No indication for changes    Hospice care patient  Provide comfort.  Appreciate hospice staff involvement for extra services.      Orders:  No new orders    Electronically signed by:  CELSA Bush CNP          Sincerely,        CELSA Bush CNP

## 2018-10-02 NOTE — PROGRESS NOTES
[facility]    Chief Complaint   Patient presents with     [facility]       [place] Medical Record Number:  [id number]    HPI:    Tian Espitia is a 86 year old  (1/20/1932), who is being seen today for a federally mandated E/M visit at [facility] .  HPI information obtained from: facility staff and [place] Epic chart review.   Today's concerns are:  -  - Resident seen and examined. Non verbal.   - Reportedly restless and has restless legs, hospice increased HS Neurontin from 100 mg to 200 mg, with good response. RN reports multiple falls.   - GNP has no concern  --------------------------------  - - Past Medical, social, family histories, medications, and allergies reviewed and updated  - Medications reviewed: in the chart and EHR.   - Case Management:   I have reviewed the care plan and MDS and do agree with the plan. Patient's desire to return to the community is not present.  Information reviewed:  Medications, vital signs, orders, and nursing notes.  MEDICATIONS:  Current Outpatient Prescriptions   Medication Sig Dispense Refill     acetaminophen (TYLENOL) 650 MG Suppository Place 650 mg rectally every 4 hours as needed for fever       ACETAMINOPHEN PO Take 1,000 mg by mouth 2 times daily       bisacodyl (DULCOLAX) 10 MG Suppository Place 10 mg rectally daily as needed for constipation       fluticasone (FLONASE) 50 MCG/ACT spray Spray 1 spray into both nostrils daily       GABAPENTIN PO Take 200 mg by mouth At Bedtime And 100mg BID       hyoscyamine (ANASPAZ/LEVSIN) 0.125 MG tablet Take 0.125 mg by mouth every 4 hours as needed for cramping       hypromellose (ARTIFICIAL TEARS) 0.4 % SOLN ophthalmic solution Place 1 drop into both eyes 4 times daily as needed for dry eyes       LEVOTHYROXINE SODIUM PO Take 75 mcg by mouth daily        morphine sulfate HIGH CONCENTRATE (ROXANOL) 20 mg/mL (HIGH CONC) solution 5mg SL five times daily. And 5mg SL every 1 hour PRN        sennosides (SENOKOT) 8.6 MG tablet Take 1 tablet by mouth 2 times daily And 2 tabs BID PRN       venlafaxine (EFFEXOR-XR) 75 MG 24 hr capsule Take 75 mg by mouth daily       ROS:  Unobtainable secondary to cognitive impairment.     Exam:  Vitals: /76  Pulse 66  Temp 98.1  F (36.7  C)  Resp 14  Ht 6' (1.829 m)  Wt 132 lb 8 oz (60.1 kg)  BMI 17.97 kg/m2  BMI= Body mass index is 17.97 kg/(m^2).  GENERAL APPEARANCE:  in no distress, cooperative  RESP:  respiratory effort and palpation of chest normal, lungs clear to auscultation , no respiratory distress  CV:  Palpation and auscultation of heart done , regular rate and rhythm, no murmur, rub, or gallop  ABDOMEN:  normal bowel sounds, soft, nontender, no hepatosplenomegaly or other masses  M/S:   Gait and station abnormal uses WC  SKIN:  Inspection of skin and subcutaneous tissue baseline, Palpation of skin and subcutaneous tissue baseline  NEURO:   no NFD appreciated on observation  PSYCH:  oriented to self, otherwise pleasantly confused.     Lab/Diagnostic data: none new.     ASSESSMENT/PLAN  - Bipolar affective disorder, currently depressed, moderate (H)  Late onset Alzheimer's disease without behavioral disturbance  - recent restless, Neurontin increased, better now.   - continue  - Continue to anticipate needs. Chronic condition, ongoing decline expected.   -  Continue to provide redirection and reassurance as needed. Maintain safe living situation with goals focused on comfort.      Other dysphagia: on DD. Continue       Hypothyroidism, unspecified type  - on LT4, given recent agitation, consider reducing LT4 further to 50 mcg.     Adult Failure to Thrive  Hospice Care:  - Symptoms managed by  GNP and Hospice Team.    Orders:  - See above, otherwise, continue the rest of the current POC.     Electronically signed by:  Derick Long MD

## 2018-10-03 NOTE — LETTER
10/3/2018        RE: Tian Espitia  [address] [facility] [address]          [place] [facility]    Chief Complaint   Patient presents with     longterm Regulatory       [place] Medical Record Number:  [id number]    HPI:    Tian Espitia is a 86 year old  (1/20/1932), who is being seen today for a federally mandated E/M visit at [facility] .  HPI information obtained from: facility staff and [facility] chart review.   Today's concerns are:  -  - Resident seen and examined. Non verbal.   - Reportedly restless and has restless legs, hospice increased HS Neurontin from 100 mg to 200 mg, with good response. RN reports multiple falls.   - GNP has no concern  --------------------------------  - - Past Medical, social, family histories, medications, and allergies reviewed and updated  - Medications reviewed: in the chart and EHR.   - Case Management:   I have reviewed the care plan and MDS and do agree with the plan. Patient's desire to return to the community is not present.  Information reviewed:  Medications, vital signs, orders, and nursing notes.  MEDICATIONS:  Current Outpatient Prescriptions   Medication Sig Dispense Refill     acetaminophen (TYLENOL) 650 MG Suppository Place 650 mg rectally every 4 hours as needed for fever       ACETAMINOPHEN PO Take 1,000 mg by mouth 2 times daily       bisacodyl (DULCOLAX) 10 MG Suppository Place 10 mg rectally daily as needed for constipation       fluticasone (FLONASE) 50 MCG/ACT spray Spray 1 spray into both nostrils daily       GABAPENTIN PO Take 200 mg by mouth At Bedtime And 100mg BID       hyoscyamine (ANASPAZ/LEVSIN) 0.125 MG tablet Take 0.125 mg by mouth every 4 hours as needed for cramping       hypromellose (ARTIFICIAL TEARS) 0.4 % SOLN ophthalmic solution Place 1 drop into both eyes 4 times daily as needed for dry eyes       LEVOTHYROXINE SODIUM PO Take 75 mcg by mouth daily        morphine sulfate HIGH CONCENTRATE (ROXANOL) 20 mg/mL (HIGH  CONC) solution 5mg SL five times daily. And 5mg SL every 1 hour PRN       sennosides (SENOKOT) 8.6 MG tablet Take 1 tablet by mouth 2 times daily And 2 tabs BID PRN       venlafaxine (EFFEXOR-XR) 75 MG 24 hr capsule Take 75 mg by mouth daily       ROS:  Unobtainable secondary to cognitive impairment.     Exam:  Vitals: /76  Pulse 66  Temp 98.1  F (36.7  C)  Resp 14  Ht 6' (1.829 m)  Wt 132 lb 8 oz (60.1 kg)  BMI 17.97 kg/m2  BMI= Body mass index is 17.97 kg/(m^2).  GENERAL APPEARANCE:  in no distress, cooperative  RESP:  respiratory effort and palpation of chest normal, lungs clear to auscultation , no respiratory distress  CV:  Palpation and auscultation of heart done , regular rate and rhythm, no murmur, rub, or gallop  ABDOMEN:  normal bowel sounds, soft, nontender, no hepatosplenomegaly or other masses  M/S:   Gait and station abnormal uses WC  SKIN:  Inspection of skin and subcutaneous tissue baseline, Palpation of skin and subcutaneous tissue baseline  NEURO:   no NFD appreciated on observation  PSYCH:  oriented to self, otherwise pleasantly confused.     Lab/Diagnostic data: none new.     ASSESSMENT/PLAN  - Bipolar affective disorder, currently depressed, moderate (H)  Late onset Alzheimer's disease without behavioral disturbance  - recent restless, Neurontin increased, better now.   - continue  - Continue to anticipate needs. Chronic condition, ongoing decline expected.   -  Continue to provide redirection and reassurance as needed. Maintain safe living situation with goals focused on comfort.      Other dysphagia: on DD. Continue       Hypothyroidism, unspecified type  - on LT4, given recent agitation, consider reducing LT4 further to 50 mcg.     Adult Failure to Thrive  Hospice Care:  - Symptoms managed by  GNP and Hospice Team.    Orders:  - See above, otherwise, continue the rest of the current POC.     Electronically signed by:  Derick Long MD        Sincerely,        Derick Long,  MD

## 2018-12-11 PROBLEM — E46 PROTEIN-CALORIE MALNUTRITION (H): Status: ACTIVE | Noted: 2018-01-01

## 2018-12-11 NOTE — LETTER
12/11/2018        RE: Tian Espitia  [address] [facility] [address]          [place] [facility]    Chief Complaint   Patient presents with     group home Regulatory       [place] Medical Record Number:  [id number]  Place of Service where encounter took place:  [facility] [[id number]]    HPI:    Tian Espitia is a 86 year old  (1/20/1932), who is being seen today for a federally mandated E/M visit.  HPI information obtained from: facility chart records, facility staff, patient report and [facility] chart review. Today's concerns are:    Resident seen and examined.  No new concerns.  Patient unable to give history or report.  Staff indicate that he has been doing well.  No recent falls.  At bedtime Neurontin had increased from 100-200 mg with good response.  Decreased restlessness.    ALLERGIES: Patient has no known allergies.  PAST MEDICAL HISTORY:  has a past medical history of Alzheimer disease, Bipolar disorder (H), CKD (chronic kidney disease), Depressive disorder, Hyperlipemia, Mental disorder, and Paralysis agitans (H).  PAST SURGICAL HISTORY:  has a past surgical history that includes IR Gastro Jejunostomy Tube Placement (01/04/2008) and hernia repair.  FAMILY HISTORY: family history includes Cerebrovascular Disease in his mother.  SOCIAL HISTORY:  reports that  has never smoked. he has never used smokeless tobacco. He reports that he does not drink alcohol or use drugs.    MEDICATIONS:  Current Outpatient Medications   Medication Sig Dispense Refill     acetaminophen (TYLENOL) 650 MG Suppository Place 650 mg rectally every 4 hours as needed for fever       ACETAMINOPHEN PO Take 1,000 mg by mouth 2 times daily       bisacodyl (DULCOLAX) 10 MG Suppository Place 10 mg rectally daily as needed for constipation       fluticasone (FLONASE) 50 MCG/ACT spray Spray 1 spray into both nostrils daily       GABAPENTIN PO Take 200 mg by mouth At Bedtime And 100mg BID       hyoscyamine  (ANASPAZ/LEVSIN) 0.125 MG tablet Take 0.125 mg by mouth every 4 hours as needed for cramping       hypromellose (ARTIFICIAL TEARS) 0.4 % SOLN ophthalmic solution Place 1 drop into both eyes 4 times daily as needed for dry eyes       LEVOTHYROXINE SODIUM PO Take 75 mcg by mouth daily        morphine sulfate HIGH CONCENTRATE (ROXANOL) 20 mg/mL (HIGH CONC) solution 5mg SL five times daily. And 5mg SL every 1 hour PRN       sennosides (SENOKOT) 8.6 MG tablet Take 1 tablet by mouth 2 times daily And 2 tabs BID PRN       venlafaxine (EFFEXOR-XR) 75 MG 24 hr capsule Take 75 mg by mouth daily       Medications reviewed:  Medications reconciled to facility chart and changes were made to reflect current medications as identified as above med list. Below are the changes that were made:   Medications stopped since last EPIC medication reconciliation:   There are no discontinued medications.    Medications started since last [facility] medication reconciliation:  No orders of the defined types were placed in this encounter.        Case Management:  I have reviewed the care plan and MDS and do agree with the plan. Patient's desire to return to the community is not assessible due to cognitive impairment.  Information reviewed:  Medications, vital signs, orders, and nursing notes.    ROS:  Unobtainable secondary to cognitive impairment.     Exam:  Vitals: /62   Pulse 90   Temp 96.9  F (36.1  C)   Resp 16   Wt 58.1 kg (128 lb)   SpO2 97%   BMI 17.36 kg/m     BMI= Body mass index is 17.36 kg/m .  GENERAL APPEARANCE:  in no distress, somnolent, Sitting in reclining wheelchair  RESP:  lungs clear to auscultation , no respiratory distress  CV:  Palpation and auscultation of heart done , regular rate and rhythm, no murmur, rub, or gallop  ABDOMEN:  no guarding or rebound, bowel sounds normal  M/S:   Gait and station abnormal Wheelchair-bound  SKIN:  No concerning lesions noted  NEURO:   No focal deficits appreciated on  observation  PSYCH:  oriented to Self, pleasantly confused    Lab/Diagnostic data: All labs reviewed, none recent.        ASSESSMENT/PLAN  Late onset Alzheimer's disease without behavioral disturbance  Bipolar affective disorder, currently depressed, moderate (H)  Increase Neurontin has stabilized restlessness.  Managed well.  Continue Effexor 75 mg daily, Neurontin 100 mg twice a day and 20 mg at at bedtime.  Continue to anticipate needs.  Continue decline expected.    Hypothyroidism, unspecified type  Was previously suggested to decrease levothyroxine to 50 mcg daily given agitation.  However his agitation has subsided.  Remains on 75 mcg daily.  TSH has not been checked since March.  However as a hospice patient we want to minimize discomfort.  We will continue at this level and continue to monitor.  If symptoms change will consider rechecking a TSH and dosage adjustments.    Adult failure to thrive  Malnutrition  Hospice care patient  Appears to be comfortable.  Symptoms managed by hospice team.  Weight down to 128.  From mid 150s 6 months ago.  BMI 17.56.  Continue with support of hospice services to maintain comfort      Orders:  1. NNO    Total time spent with patient visit at the skilled nursing facility was 25 min including patient visit and review of past records. Greater than 50% of total time spent with counseling and coordinating care due to comprehensive assessment    Electronically signed by:  CELSA Bush CNP        Sincerely,        CELSA Bush CNP

## 2018-12-11 NOTE — PROGRESS NOTES
[facility]    Chief Complaint   Patient presents with     long term Regulatory       [place] Medical Record Number:  [id number]  Place of Service where encounter took place:  [facility] [[id number]]    HPI:    Tian Espitia is a 86 year old  (1/20/1932), who is being seen today for a federally mandated E/M visit.  HPI information obtained from: facility chart records, facility staff, patient report and [facility] chart review. Today's concerns are:    Resident seen and examined.  No new concerns.  Patient unable to give history or report.  Staff indicate that he has been doing well.  No recent falls.  At bedtime Neurontin had increased from 100-200 mg with good response.  Decreased restlessness.    ALLERGIES: Patient has no known allergies.  PAST MEDICAL HISTORY:  has a past medical history of Alzheimer disease, Bipolar disorder (H), CKD (chronic kidney disease), Depressive disorder, Hyperlipemia, Mental disorder, and Paralysis agitans (H).  PAST SURGICAL HISTORY:  has a past surgical history that includes IR Gastro Jejunostomy Tube Placement (01/04/2008) and hernia repair.  FAMILY HISTORY: family history includes Cerebrovascular Disease in his mother.  SOCIAL HISTORY:  reports that  has never smoked. he has never used smokeless tobacco. He reports that he does not drink alcohol or use drugs.    MEDICATIONS:  Current Outpatient Medications   Medication Sig Dispense Refill     acetaminophen (TYLENOL) 650 MG Suppository Place 650 mg rectally every 4 hours as needed for fever       ACETAMINOPHEN PO Take 1,000 mg by mouth 2 times daily       bisacodyl (DULCOLAX) 10 MG Suppository Place 10 mg rectally daily as needed for constipation       fluticasone (FLONASE) 50 MCG/ACT spray Spray 1 spray into both nostrils daily       GABAPENTIN PO Take 200 mg by mouth At Bedtime And 100mg BID       hyoscyamine (ANASPAZ/LEVSIN) 0.125 MG tablet Take 0.125 mg by mouth every 4 hours as needed for  cramping       hypromellose (ARTIFICIAL TEARS) 0.4 % SOLN ophthalmic solution Place 1 drop into both eyes 4 times daily as needed for dry eyes       LEVOTHYROXINE SODIUM PO Take 75 mcg by mouth daily        morphine sulfate HIGH CONCENTRATE (ROXANOL) 20 mg/mL (HIGH CONC) solution 5mg SL five times daily. And 5mg SL every 1 hour PRN       sennosides (SENOKOT) 8.6 MG tablet Take 1 tablet by mouth 2 times daily And 2 tabs BID PRN       venlafaxine (EFFEXOR-XR) 75 MG 24 hr capsule Take 75 mg by mouth daily       Medications reviewed:  Medications reconciled to facility chart and changes were made to reflect current medications as identified as above med list. Below are the changes that were made:   Medications stopped since last EPIC medication reconciliation:   There are no discontinued medications.    Medications started since last [facility] medication reconciliation:  No orders of the defined types were placed in this encounter.        Case Management:  I have reviewed the care plan and MDS and do agree with the plan. Patient's desire to return to the community is not assessible due to cognitive impairment.  Information reviewed:  Medications, vital signs, orders, and nursing notes.    ROS:  Unobtainable secondary to cognitive impairment.     Exam:  Vitals: /62   Pulse 90   Temp 96.9  F (36.1  C)   Resp 16   Wt 58.1 kg (128 lb)   SpO2 97%   BMI 17.36 kg/m    BMI= Body mass index is 17.36 kg/m .  GENERAL APPEARANCE:  in no distress, somnolent, Sitting in reclining wheelchair  RESP:  lungs clear to auscultation , no respiratory distress  CV:  Palpation and auscultation of heart done , regular rate and rhythm, no murmur, rub, or gallop  ABDOMEN:  no guarding or rebound, bowel sounds normal  M/S:   Gait and station abnormal Wheelchair-bound  SKIN:  No concerning lesions noted  NEURO:   No focal deficits appreciated on observation  PSYCH:  oriented to Self, pleasantly confused    Lab/Diagnostic data: All labs  reviewed, none recent.        ASSESSMENT/PLAN  Late onset Alzheimer's disease without behavioral disturbance  Bipolar affective disorder, currently depressed, moderate (H)  Increase Neurontin has stabilized restlessness.  Managed well.  Continue Effexor 75 mg daily, Neurontin 100 mg twice a day and 20 mg at at bedtime.  Continue to anticipate needs.  Continue decline expected.    Hypothyroidism, unspecified type  Was previously suggested to decrease levothyroxine to 50 mcg daily given agitation.  However his agitation has subsided.  Remains on 75 mcg daily.  TSH has not been checked since March.  However as a hospice patient we want to minimize discomfort.  We will continue at this level and continue to monitor.  If symptoms change will consider rechecking a TSH and dosage adjustments.    Adult failure to thrive  Malnutrition  Hospice care patient  Appears to be comfortable.  Symptoms managed by hospice team.  Weight down to 128.  From mid 150s 6 months ago.  BMI 17.56.  Continue with support of hospice services to maintain comfort      Orders:  1. NNO    Total time spent with patient visit at the skilled nursing facility was 25 min including patient visit and review of past records. Greater than 50% of total time spent with counseling and coordinating care due to comprehensive assessment    Electronically signed by:  CELSA Bush CNP

## 2019-01-01 ENCOUNTER — NURSING HOME VISIT (OUTPATIENT)
Dept: GERIATRICS | Facility: CLINIC | Age: 84
End: 2019-01-01
Payer: MEDICARE

## 2019-01-01 VITALS
OXYGEN SATURATION: 97 % | HEART RATE: 83 BPM | WEIGHT: 122.68 LBS | DIASTOLIC BLOOD PRESSURE: 90 MMHG | BODY MASS INDEX: 16.62 KG/M2 | HEIGHT: 72 IN | TEMPERATURE: 96.6 F | RESPIRATION RATE: 14 BRPM | SYSTOLIC BLOOD PRESSURE: 145 MMHG

## 2019-01-01 DIAGNOSIS — Z51.5 HOSPICE CARE PATIENT: ICD-10-CM

## 2019-01-01 DIAGNOSIS — R13.19 OTHER DYSPHAGIA: ICD-10-CM

## 2019-01-01 DIAGNOSIS — G30.1 LATE ONSET ALZHEIMER'S DISEASE WITHOUT BEHAVIORAL DISTURBANCE (H): ICD-10-CM

## 2019-01-01 DIAGNOSIS — E03.9 HYPOTHYROIDISM, UNSPECIFIED TYPE: ICD-10-CM

## 2019-01-01 DIAGNOSIS — R62.7 ADULT FAILURE TO THRIVE: ICD-10-CM

## 2019-01-01 DIAGNOSIS — F02.80 LATE ONSET ALZHEIMER'S DISEASE WITHOUT BEHAVIORAL DISTURBANCE (H): ICD-10-CM

## 2019-01-01 DIAGNOSIS — F31.32 BIPOLAR AFFECTIVE DISORDER, CURRENTLY DEPRESSED, MODERATE (H): Primary | ICD-10-CM

## 2019-01-01 PROCEDURE — 99207 ZZC CDG-CUT & PASTE-POTENTIAL IMPACT ON LEVEL: CPT | Performed by: FAMILY MEDICINE

## 2019-01-01 ASSESSMENT — MIFFLIN-ST. JEOR: SCORE: 1269.47

## 2019-01-01 ASSESSMENT — PAIN SCALES - GENERAL: PAINLEVEL: NO PAIN (0)

## 2019-02-05 NOTE — PROGRESS NOTES
[facility]  Chief Complaint   Patient presents with     long-term Regulatory   [place] Medical Record Number:  [id number]  Location:   HPI:    Tian Espitia is a 86 year old  (1/20/1932), who is being seen today for a federally mandated E/M visit at [facility] .  HPI information obtained from: facility staff and [place] Epic chart review.   Today's concerns are:  -  - Resident seen and examined. Non verbal.   - RN reports progressively declining   - GNP has no concern  --------------------------------  - - Past Medical, social, family histories, medications, and allergies reviewed and updated  - Medications reviewed: in the chart and EHR.   - Case Management:   I have reviewed the care plan and MDS and do agree with the plan. Patient's desire to return to the community is not present.  Information reviewed:  Medications, vital signs, orders, and nursing notes.  MEDICATIONS:  Current Outpatient Medications   Medication Sig Dispense Refill     ACETAMINOPHEN PO Take 1,000 mg by mouth 2 times daily       bisacodyl (DULCOLAX) 10 MG Suppository Place 10 mg rectally daily as needed for constipation       fluticasone (FLONASE) 50 MCG/ACT spray Spray 1 spray into both nostrils daily       GABAPENTIN PO Take 200 mg by mouth At Bedtime And 100mg BID       hyoscyamine (ANASPAZ/LEVSIN) 0.125 MG tablet Take 0.125 mg by mouth every 4 hours as needed for cramping       hypromellose (ARTIFICIAL TEARS) 0.4 % SOLN ophthalmic solution Place 1 drop into both eyes 4 times daily as needed for dry eyes       LEVOTHYROXINE SODIUM PO Take 75 mcg by mouth daily        morphine sulfate HIGH CONCENTRATE (ROXANOL) 20 mg/mL (HIGH CONC) solution 5mg SL five times daily. And 5mg SL every 1 hour PRN       sennosides (SENOKOT) 8.6 MG tablet Take 1 tablet by mouth 2 times daily And 2 tabs BID PRN       venlafaxine (EFFEXOR-XR) 75 MG 24 hr capsule Take 75 mg by mouth daily       acetaminophen (TYLENOL) 650 MG  Suppository Place 650 mg rectally every 4 hours as needed for fever       ROS:  Unobtainable secondary to cognitive impairment.     Exam:  Vitals: /90   Pulse 83   Temp 96.6  F (35.9  C)   Resp 14   Ht 1.829 m (6')   Wt 55.6 kg (122 lb 10.9 oz)   SpO2 97%   BMI 16.64 kg/m    BMI= Body mass index is 16.64 kg/m .  GENERAL APPEARANCE:  in no distress, cooperative  RESP:  respiratory effort and palpation of chest normal, lungs clear to auscultation , no respiratory distress  CV:  Palpation and auscultation of heart done , regular rate and rhythm, no murmur, rub, or gallop  ABDOMEN:  normal bowel sounds, soft, nontender, no hepatosplenomegaly or other masses  M/S:   Gait and station abnormal uses WC  SKIN:  Inspection of skin and subcutaneous tissue baseline, Palpation of skin and subcutaneous tissue baseline  NEURO:   no NFD appreciated on observation  PSYCH:  oriented to self, otherwise pleasantly confused.     Lab/Diagnostic data: none new.     ASSESSMENT/PLAN  - Bipolar affective disorder, currently depressed, moderate (H)  Late onset Alzheimer's disease without behavioral disturbance  - at baseline.   - Continue to anticipate needs. Chronic condition, ongoing decline expected.   -  Continue to provide redirection and reassurance as needed. Maintain safe living situation with goals focused on comfort.      Other dysphagia: on DD. Continue       Hypothyroidism, unspecified type  - on LT4, 75 mcg, consider reducing dose to 50 mcg.     Adult Failure to Thrive  Hospice Care:  - Symptoms managed by  GNP and Hospice Team.    Orders:  - See above, otherwise, continue the rest of the current POC.     Electronically signed by:  Derick Long MD

## 2019-02-06 NOTE — LETTER
2/6/2019        RE: Tian Espitia  [address] [address]        [place] [facility]  Chief Complaint   Patient presents with     [facility] Regulatory   [place] Medical Record Number:  [id number]  Location:   HPI:    Tian Espitia is a 86 year old  (1/20/1932), who is being seen today for a federally mandated E/M visit at [facility] .  HPI information obtained from: facility staff and [facility] chart review.   Today's concerns are:  -  - Resident seen and examined. Non verbal.   - RN reports progressively declining   - GNP has no concern  --------------------------------  - - Past Medical, social, family histories, medications, and allergies reviewed and updated  - Medications reviewed: in the chart and EHR.   - Case Management:   I have reviewed the care plan and MDS and do agree with the plan. Patient's desire to return to the community is not present.  Information reviewed:  Medications, vital signs, orders, and nursing notes.  MEDICATIONS:  Current Outpatient Medications   Medication Sig Dispense Refill     ACETAMINOPHEN PO Take 1,000 mg by mouth 2 times daily       bisacodyl (DULCOLAX) 10 MG Suppository Place 10 mg rectally daily as needed for constipation       fluticasone (FLONASE) 50 MCG/ACT spray Spray 1 spray into both nostrils daily       GABAPENTIN PO Take 200 mg by mouth At Bedtime And 100mg BID       hyoscyamine (ANASPAZ/LEVSIN) 0.125 MG tablet Take 0.125 mg by mouth every 4 hours as needed for cramping       hypromellose (ARTIFICIAL TEARS) 0.4 % SOLN ophthalmic solution Place 1 drop into both eyes 4 times daily as needed for dry eyes       LEVOTHYROXINE SODIUM PO Take 75 mcg by mouth daily        morphine sulfate HIGH CONCENTRATE (ROXANOL) 20 mg/mL (HIGH CONC) solution 5mg SL five times daily. And 5mg SL every 1 hour PRN       sennosides (SENOKOT) 8.6 MG tablet Take 1 tablet by mouth 2 times daily And 2 tabs BID PRN       venlafaxine (EFFEXOR-XR) 75 MG 24 hr capsule  Take 75 mg by mouth daily       acetaminophen (TYLENOL) 650 MG Suppository Place 650 mg rectally every 4 hours as needed for fever       ROS:  Unobtainable secondary to cognitive impairment.     Exam:  Vitals: /90   Pulse 83   Temp 96.6  F (35.9  C)   Resp 14   Ht 1.829 m (6')   Wt 55.6 kg (122 lb 10.9 oz)   SpO2 97%   BMI 16.64 kg/m     BMI= Body mass index is 16.64 kg/m .  GENERAL APPEARANCE:  in no distress, cooperative  RESP:  respiratory effort and palpation of chest normal, lungs clear to auscultation , no respiratory distress  CV:  Palpation and auscultation of heart done , regular rate and rhythm, no murmur, rub, or gallop  ABDOMEN:  normal bowel sounds, soft, nontender, no hepatosplenomegaly or other masses  M/S:   Gait and station abnormal uses WC  SKIN:  Inspection of skin and subcutaneous tissue baseline, Palpation of skin and subcutaneous tissue baseline  NEURO:   no NFD appreciated on observation  PSYCH:  oriented to self, otherwise pleasantly confused.     Lab/Diagnostic data: none new.     ASSESSMENT/PLAN  - Bipolar affective disorder, currently depressed, moderate (H)  Late onset Alzheimer's disease without behavioral disturbance  - at baseline.   - Continue to anticipate needs. Chronic condition, ongoing decline expected.   -  Continue to provide redirection and reassurance as needed. Maintain safe living situation with goals focused on comfort.      Other dysphagia: on DD. Continue       Hypothyroidism, unspecified type  - on LT4, 75 mcg, consider reducing dose to 50 mcg.     Adult Failure to Thrive  Hospice Care:  - Symptoms managed by  GNP and Hospice Team.    Orders:  - See above, otherwise, continue the rest of the current POC.     Electronically signed by:  Derick Long MD      Sincerely,        Derick Long MD

## 2024-03-12 NOTE — PLAN OF CARE
"Problem: Patient Care Overview  Goal: Plan of Care/Patient Progress Review  [facility] [place] ADMISSION NOTE    Patient admitted to room 2401 at approximately 2300 via cart from emergency room. Patient was accompanied by transport tech.     Verbal SBAR report received from JUAN fam prior to patient arrival.     Patient ambulated to bed with 2 assist. Patient alert and oriented X 1. The patient is not having any pain.  . Admission vital signs: Blood pressure (!) 158/91, temperature 99  F (37.2  C), temperature source Oral, resp. rate 20, height 1.727 m (5' 8\"), weight 63.5 kg (139 lb 15.9 oz), SpO2 94 %. Patient was oriented to plan of care, call light, bed controls, tv, telephone, bathroom and visiting hours.     Risk Assessment    The following safety risks were identified during admission: fall. Yellow risk band applied: YES.     Skin Initial Assessment    This writer admitted this patient and completed a full skin assessment and Bijan score in the Adult PCS flowsheet. Appropriate interventions initiated as needed.     Secondary skin check completed by JUAN pope.    Skin  Inspection of bony prominences: Full  Inspection under devices: Focused Inspection (identify device(s) inspected)  Focused inspection under devices: Glasses  Skin WDL: WDL, characteristics  Skin Temperature: warm  Skin Moisture: dry  Skin Integrity:  (pink birthmark to back of neck)    Bijan Risk Assessment  Sensory Perception: 3-->slightly limited  Moisture: 3-->occasionally moist  Activity: 3-->walks occasionally  Mobility: 3-->slightly limited  Nutrition: 3-->adequate  Friction and Shear: 3-->no apparent problem  Bijan Score: 18  Bed Support Surface: Atmos Air mattress  Reassessed using Bed Algorithm: No  Bijan Intervention(s) Implemented: heels suspended, HOB elevated 30 degrees or less, incontinence care, repositioned/turned q2hr    Patsy Vazquez        "
Problem: Patient Care Overview  Goal: Plan of Care/Patient Progress Review  Outcome: Adequate for Discharge Date Met: 05/11/18  [facility] DISCHARGE NOTE    Patient discharged to nursing home at 2:10 PM via wheel chair. Accompanied by other:Bhavik transport and staff. Discharge instructions reviewed with caregiver, opportunity offered to ask questions. Prescriptions sent with patient to fill . All belongings sent with patient. Wife aware of discharge.   Report called to Asher in SBAR format.   Yue Kovacs      
Problem: Patient Care Overview  Goal: Plan of Care/Patient Progress Review  Outcome: No Change  Patient is alert, disoriented to time, place and situation. Is calm and cooperative, staff continues to anticipate needs. Needs help eating. No cough noted, clear nasal drainage present which wife reports over the phone being an issue for years. Incontinent of urine and voided on toilet. Alarm on for safety. Ambulates in room with 1 assist. IVF's infusing as ordered.       
Problem: Patient Care Overview  Goal: Plan of Care/Patient Progress Review  Pt alert to self. Temp was 99.4 oral on admit now 98.5. Pt up to BSC when first admitted to med/surg. Able to stand & walk with assist of 2 (needs direction). Voided 200cc's & UA sent at that time. Pt incont of urine at this time. Pt pleasant, cooperative & has not attempted to get out of bed. Denies pain. Repositions self in bed. Bed alarm on for safety. Checked with SHAI leon earlier to see if pt could have his PTA aspirin & some tylenol. Gave these meds crushed with spoonfull of water & pt tolerated well with no choking or coughing. sats on RA 97%, lungs diminished/clear.       
Universal Safety Interventions